# Patient Record
Sex: FEMALE | Race: WHITE | NOT HISPANIC OR LATINO | Employment: OTHER | URBAN - METROPOLITAN AREA
[De-identification: names, ages, dates, MRNs, and addresses within clinical notes are randomized per-mention and may not be internally consistent; named-entity substitution may affect disease eponyms.]

---

## 2017-11-28 ENCOUNTER — HOSPITAL ENCOUNTER (EMERGENCY)
Facility: HOSPITAL | Age: 67
Discharge: HOME | End: 2017-11-28
Payer: MEDICARE

## 2017-11-28 VITALS
OXYGEN SATURATION: 100 % | BODY MASS INDEX: 22.06 KG/M2 | HEART RATE: 100 BPM | WEIGHT: 112.38 LBS | RESPIRATION RATE: 18 BRPM | SYSTOLIC BLOOD PRESSURE: 150 MMHG | HEIGHT: 60 IN | DIASTOLIC BLOOD PRESSURE: 67 MMHG | TEMPERATURE: 99 F

## 2017-11-28 DIAGNOSIS — R05.9 COUGH: ICD-10-CM

## 2017-11-28 DIAGNOSIS — R09.82 POST-NASAL DRIP: ICD-10-CM

## 2017-11-28 DIAGNOSIS — J40 BRONCHITIS: Primary | ICD-10-CM

## 2017-11-28 PROCEDURE — 99283 EMERGENCY DEPT VISIT LOW MDM: CPT

## 2017-11-28 RX ORDER — FLUTICASONE PROPIONATE 50 MCG
2 SPRAY, SUSPENSION (ML) NASAL DAILY
Qty: 1 BOTTLE | Refills: 0 | Status: ON HOLD | OUTPATIENT
Start: 2017-11-28 | End: 2023-06-11

## 2017-11-28 RX ORDER — BENZONATATE 100 MG/1
100 CAPSULE ORAL 3 TIMES DAILY PRN
Qty: 30 CAPSULE | Refills: 0 | Status: SHIPPED | OUTPATIENT
Start: 2017-11-28 | End: 2017-12-08

## 2017-11-28 RX ORDER — LORATADINE 10 MG/1
10 TABLET ORAL DAILY
COMMUNITY

## 2017-11-29 NOTE — ED NOTES
Patient sitting talking with family. Chest xray completed. Patient talking in full sentences. Nad noted

## 2017-11-29 NOTE — DISCHARGE INSTRUCTIONS
Rest  Drink plenty of clear fluids--at least 64 ounces of water/juice  Normal saline nasal wash to irrigate sinuses and for congestion/runny nose  Cool mist humidifier/vaporizer  Practice good handwashing  Zyrtec or Claritin to help dry mucus and post nasal drip  Mucinex, Robitussin, Coricidin HBP Cough and Cold for cough and chest congestion  Tylenol or Ibuprofen for fever, headache and body aches  Warm salt water gargles for throat comfort  Chloraseptic spray or lozenges for throat comfort  See PCP or go to ER if symptoms worsen or fail to improve with treatment.

## 2017-11-29 NOTE — ED PROVIDER NOTES
History      Chief Complaint   Patient presents with    URI     Patient reports that she has been havintg a cold for over 1 week. she reports its the coughing that is the worse       Review of patient's allergies indicates:   Allergen Reactions    Iodine and iodide containing products Rash        HPI   HPI    2017, 6:41 PM   History obtained from the patient      History of Present Illness: Pia Mariee is a 67 y.o. female patient who presents to the Emergency Department for cold x one week.  Associated symptoms include cough, sneezing, sore throat, post nasal drip, nasal congestion, laryngitis.  Patient reports negative strep culture.  Denies fever, otalgia, chest pain, wheezing, SOB. Treatments tried include Claritin.        Arrival mode: Personal vehicle      PCP: Primary Doctor No       Past Medical History:  Past Medical History:   Diagnosis Date    Anemia     Arthritis     Cancer breast    right    Carotid artery disease     left    Coronary artery disease     Fibromyalgia     GERD (gastroesophageal reflux disease)     High cholesterol     Hypertension     Osteoporosis        Past Surgical History:  Past Surgical History:   Procedure Laterality Date    BLADDER SURGERY      BREAST SURGERY      CARDIAC SURGERY       SECTION      MASTECTOMY, RADICAL Right     TUBAL LIGATION           Family History:  Family History   Problem Relation Age of Onset    Family history unknown: Yes       Social History:  Social History     Social History Main Topics    Smoking status: Never Smoker    Smokeless tobacco: Never Used    Alcohol use No    Drug use: No    Sexual activity: Not on file       ROS   Review of Systems   Constitutional: Negative for chills and fever.   HENT: Positive for congestion, postnasal drip, sneezing and sore throat. Negative for ear pain.    Respiratory: Positive for cough. Negative for shortness of breath and wheezing.    Cardiovascular: Negative for  chest pain and palpitations.   Gastrointestinal: Negative for diarrhea and vomiting.       Physical Exam      Initial Vitals [11/28/17 1734]   BP Pulse Resp Temp SpO2   (!) 140/67 105 20 98.5 °F (36.9 °C) 99 %      MAP       91.33          Physical Exam  Nursing Notes and Vital Signs Reviewed.  Constitutional: Patient is in no apparent distress. Awake and alert. Well-developed and well-nourished.  Head: Atraumatic. Normocephalic.  Eyes: PERRL. EOM intact. Conjunctivae are not pale. No scleral icterus.  ENT: Mucous membranes are moist. Oropharynx is clear and symmetric.  Nasal congestion. Post nasal drip  Neck: Supple. Full ROM. No lymphadenopathy.  Cardiovascular: Regular rate. Regular rhythm. No murmurs, rubs, or gallops. Distal pulses are 2+ and symmetric.  Pulmonary/Chest: No respiratory distress. Clear to auscultation bilaterally. No wheezing, rales, or rhonchi.  Cough noted.  Abdominal: Soft and non-distended.  There is no tenderness.  No rebound, guarding, or rigidity. Good bowel sounds.  Genitourinary: No CVA tenderness  Musculoskeletal: Moves all extremities. No obvious deformities. No edema. No calf tenderness.  Skin: Warm and dry.  Neurological:  Alert, awake, and appropriate.  Normal speech.  No acute focal neurological deficits are appreciated.  Psychiatric: Normal affect. Good eye contact. Appropriate in content.    ED Course    Procedures  ED Vital Signs:  Vitals:    11/28/17 1734 11/28/17 1950   BP: (!) 140/67 (!) 150/67   Pulse: 105 100   Resp: 20 18   Temp: 98.5 °F (36.9 °C) 99.1 °F (37.3 °C)   TempSrc: Oral Oral   SpO2: 99% 100%   Weight: 51 kg (112 lb 6.4 oz)    Height: 5' (1.524 m)        Abnormal Lab Results:  Labs Reviewed - No data to display         Imaging Results:  Imaging Results          X-Ray Chest PA And Lateral (Final result)  Result time 11/28/17 19:46:45    Final result by Chaz Mendez MD (11/28/17 19:46:45)                 Impression:     No acute process. No significant change  from prior exam.      Electronically signed by: RHONDA GUNTER MD  Date:     11/28/17  Time:    19:46              Narrative:    Exam: Chest X-ray, two views.    History: Cough    Findings: No infiltrate or effusion identified. Cardiomediastinal silhouette is within normal limits. No significant change from prior study dated 02/01/2016. Right mastectomy again noted.                                      The Emergency Provider reviewed the vital signs and test results, which are outlined above.    ED Discussion     8:03 PM:  Discussed with pt all pertinent ED information and results. Discussed pt dx and plan of tx. Gave pt all f/u and return to the ED instructions. All questions and concerns were addressed at this time. Pt expresses understanding of information and instructions, and is comfortable with plan to discharge. Pt is stable for discharge.    Pre-hypertension/Hypertension: The pt has been informed that they may have pre-hypertension or hypertension based on a blood pressure reading in the ED. I recommend that the pt call the PCP listed on their discharge instructions or a physician of their choice this week to arrange f/u for further evaluation of possible pre-hypertension or hypertension.       ED Medication(s):  Medications - No data to display    Discharge Medication List as of 11/28/2017  8:04 PM      START taking these medications    Details   benzonatate (TESSALON) 100 MG capsule Take 1 capsule (100 mg total) by mouth 3 (three) times daily as needed for Cough., Starting Tue 11/28/2017, Until Fri 12/8/2017, Print      fluticasone (FLONASE) 50 mcg/actuation nasal spray 2 sprays by Each Nare route once daily., Starting Tue 11/28/2017, Print             Follow-up Information     Primary Care Doctor.    Contact information:  Follow-up in 2-3 days                   Medical Decision Making                  Clinical Impression       ICD-10-CM ICD-9-CM   1. Bronchitis J40 490   2. Cough R05 786.2   3. Post-nasal  fiordaliza R09.82 784.91       Disposition:   Disposition: Discharged  Condition: Stable           Neha Trinidad PA-C  11/28/17 [id number]

## 2021-04-09 ENCOUNTER — TELEPHONE (OUTPATIENT)
Dept: RHEUMATOLOGY | Facility: CLINIC | Age: 71
End: 2021-04-09

## 2023-04-22 ENCOUNTER — HOSPITAL ENCOUNTER (EMERGENCY)
Facility: HOSPITAL | Age: 73
Discharge: HOME | End: 2023-04-22
Attending: EMERGENCY MEDICINE
Payer: MEDICARE

## 2023-04-22 VITALS
RESPIRATION RATE: 18 BRPM | HEART RATE: 81 BPM | WEIGHT: 116.19 LBS | HEIGHT: 60 IN | BODY MASS INDEX: 22.81 KG/M2 | TEMPERATURE: 98 F | OXYGEN SATURATION: 95 % | SYSTOLIC BLOOD PRESSURE: 172 MMHG | DIASTOLIC BLOOD PRESSURE: 72 MMHG

## 2023-04-22 DIAGNOSIS — J06.9 VIRAL URI WITH COUGH: Primary | ICD-10-CM

## 2023-04-22 DIAGNOSIS — R05.9 COUGH: ICD-10-CM

## 2023-04-22 LAB
CTP QC/QA: YES
SARS-COV-2 RDRP RESP QL NAA+PROBE: NEGATIVE

## 2023-04-22 PROCEDURE — 99283 EMERGENCY DEPT VISIT LOW MDM: CPT | Mod: 25,ER

## 2023-04-22 RX ORDER — PROMETHAZINE HYDROCHLORIDE AND DEXTROMETHORPHAN HYDROBROMIDE 6.25; 15 MG/5ML; MG/5ML
5 SYRUP ORAL EVERY 6 HOURS PRN
Qty: 120 ML | Refills: 0 | Status: SHIPPED | OUTPATIENT
Start: 2023-04-22 | End: 2023-05-02

## 2023-04-24 NOTE — ED PROVIDER NOTES
Encounter Date: 2023       History     Chief Complaint   Patient presents with    Cough     Cough, sneezing runny nose x 5 days     Patient complains of a cough nasal congestion for 5 days. Denies mitigating or exacerbating factors did not take anything prior to arrival for this problem        Review of patient's allergies indicates:   Allergen Reactions    Iodine and iodide containing products Rash     Past Medical History:   Diagnosis Date    Anemia     Arthritis     Cancer breast    right    Carotid artery disease     left    Coronary artery disease     Fibromyalgia     GERD (gastroesophageal reflux disease)     High cholesterol     Hypertension     Osteoporosis      Past Surgical History:   Procedure Laterality Date    BLADDER SURGERY      BREAST SURGERY      CARDIAC SURGERY       SECTION      MASTECTOMY, RADICAL Right     TUBAL LIGATION       Family History   Family history unknown: Yes     Social History     Tobacco Use    Smoking status: Never    Smokeless tobacco: Never   Substance Use Topics    Alcohol use: No     Alcohol/week: 0.0 standard drinks    Drug use: No     Review of Systems   Constitutional:  Negative for fever.   HENT:  Negative for sore throat.    Respiratory:  Negative for shortness of breath.    Cardiovascular:  Negative for chest pain.   Gastrointestinal:  Negative for nausea.   Genitourinary:  Negative for dysuria.   Musculoskeletal:  Negative for back pain.   Skin:  Negative for rash.   Neurological:  Negative for weakness.   Hematological:  Does not bruise/bleed easily.     Physical Exam     Initial Vitals [23 1134]   BP Pulse Resp Temp SpO2   (!) 172/72 81 18 98 °F (36.7 °C) 95 %      MAP       --         Physical Exam    Nursing note and vitals reviewed.  Constitutional: She appears well-developed and well-nourished. She is not diaphoretic. She is active.  Non-toxic appearance. No distress.   HENT:   Head: Normocephalic and atraumatic.   Eyes: Conjunctivae are normal.  Right eye exhibits no discharge. Left eye exhibits no discharge. No scleral icterus.   Neck:   Normal range of motion.  Cardiovascular:  Normal rate, regular rhythm and intact distal pulses.           No murmur heard.  Pulmonary/Chest: Breath sounds normal. No respiratory distress. She has no wheezes. She has no rhonchi. She has no rales. She exhibits no tenderness.   Abdominal: She exhibits no distension.   Musculoskeletal:         General: No tenderness. Normal range of motion.      Cervical back: Normal range of motion.     Neurological: She is alert and oriented to person, place, and time. No cranial nerve deficit. GCS score is 15. GCS eye subscore is 4. GCS verbal subscore is 5. GCS motor subscore is 6.   Skin: Skin is warm and dry. Capillary refill takes less than 2 seconds. No rash noted.   Psychiatric: She has a normal mood and affect. Her behavior is normal. Judgment and thought content normal.       ED Course   Procedures  Labs Reviewed   SARS-COV-2 RDRP GENE          Imaging Results              X-Ray Chest 1 View (Final result)  Result time 04/22/23 13:47:12      Final result by Vamsi Whaley MD (Timothy) (04/22/23 13:47:12)                   Impression:      Negative single view chest x-ray.      Electronically signed by: Vamsi Whaley MD  Date:    04/22/2023  Time:    13:47               Narrative:    EXAMINATION:  XR CHEST 1 VIEW    CLINICAL HISTORY:  Cough,    COMPARISON:  11/28/2017    FINDINGS:  Heart size is normal. The lung fields are clear. No acute cardiopulmonary infiltrate.                                       Medications - No data to display                           Clinical Impression:   Final diagnoses:  [R05.9] Cough  [J06.9] Viral URI with cough (Primary)        ED Disposition Condition    Discharge Stable          ED Prescriptions       Medication Sig Dispense Start Date End Date Auth. Provider    promethazine-dextromethorphan (PROMETHAZINE-DM) 6.25-15 mg/5 mL Syrp Take 5 mLs by  mouth every 6 (six) hours as needed. 120 mL 4/22/2023 5/2/2023 Robe Grossman NP          Follow-up Information       Follow up With Specialties Details Why Contact Info    pcp  Schedule an appointment as soon as possible for a visit  As needed              Robe Grossman NP  04/23/23 1933

## 2023-06-11 ENCOUNTER — HOSPITAL ENCOUNTER (INPATIENT)
Facility: HOSPITAL | Age: 73
LOS: 1 days | Discharge: HOME | DRG: 641 | End: 2023-06-12
Attending: EMERGENCY MEDICINE | Admitting: INTERNAL MEDICINE
Payer: MEDICARE

## 2023-06-11 DIAGNOSIS — R42 LIGHTHEADED: ICD-10-CM

## 2023-06-11 DIAGNOSIS — R42 DIZZINESS: ICD-10-CM

## 2023-06-11 DIAGNOSIS — E87.6 HYPOKALEMIA: ICD-10-CM

## 2023-06-11 DIAGNOSIS — R94.31 ABNORMAL EKG: ICD-10-CM

## 2023-06-11 DIAGNOSIS — R07.9 CHEST PAIN: ICD-10-CM

## 2023-06-11 DIAGNOSIS — E83.42 HYPOMAGNESEMIA: ICD-10-CM

## 2023-06-11 DIAGNOSIS — R00.2 PALPITATIONS: Primary | ICD-10-CM

## 2023-06-11 PROBLEM — E78.2 MIXED HYPERLIPIDEMIA: Status: ACTIVE | Noted: 2023-06-11

## 2023-06-11 PROBLEM — K21.9 GERD (GASTROESOPHAGEAL REFLUX DISEASE): Status: ACTIVE | Noted: 2023-06-11

## 2023-06-11 PROBLEM — D64.89 OTHER SPECIFIED ANEMIAS: Status: ACTIVE | Noted: 2023-06-11

## 2023-06-11 LAB
ALBUMIN SERPL BCP-MCNC: 4.1 G/DL (ref 3.5–5.2)
ALP SERPL-CCNC: 64 U/L (ref 55–135)
ALT SERPL W/O P-5'-P-CCNC: 20 U/L (ref 10–44)
ANION GAP SERPL CALC-SCNC: 16 MMOL/L (ref 8–16)
AST SERPL-CCNC: 20 U/L (ref 10–40)
BACTERIA #/AREA URNS AUTO: ABNORMAL /HPF
BASOPHILS # BLD AUTO: 0.04 K/UL (ref 0–0.2)
BASOPHILS NFR BLD: 0.4 % (ref 0–1.9)
BILIRUB SERPL-MCNC: 0.4 MG/DL (ref 0.1–1)
BILIRUB UR QL STRIP: NEGATIVE
BNP SERPL-MCNC: 70 PG/ML (ref 0–99)
BUN SERPL-MCNC: 11 MG/DL (ref 8–23)
CALCIUM SERPL-MCNC: 9.4 MG/DL (ref 8.7–10.5)
CHLORIDE SERPL-SCNC: 103 MMOL/L (ref 95–110)
CHLORIDE UR-SCNC: 95 MMOL/L (ref 25–200)
CLARITY UR REFRACT.AUTO: CLEAR
CO2 SERPL-SCNC: 22 MMOL/L (ref 23–29)
COLOR UR AUTO: YELLOW
CREAT SERPL-MCNC: 1.3 MG/DL (ref 0.5–1.4)
CREAT UR-MCNC: 78.5 MG/DL (ref 15–325)
D DIMER PPP IA.FEU-MCNC: 0.27 MG/L FEU
DIFFERENTIAL METHOD: ABNORMAL
EOSINOPHIL # BLD AUTO: 0 K/UL (ref 0–0.5)
EOSINOPHIL NFR BLD: 0.2 % (ref 0–8)
ERYTHROCYTE [DISTWIDTH] IN BLOOD BY AUTOMATED COUNT: 13.4 % (ref 11.5–14.5)
EST. GFR  (NO RACE VARIABLE): 42.9 ML/MIN/1.73 M^2
GLUCOSE SERPL-MCNC: 135 MG/DL (ref 70–110)
GLUCOSE UR QL STRIP: NEGATIVE
HCT VFR BLD AUTO: 35.1 % (ref 37–48.5)
HGB BLD-MCNC: 11.4 G/DL (ref 12–16)
HGB UR QL STRIP: NEGATIVE
HYALINE CASTS UR QL AUTO: 0 /LPF
IMM GRANULOCYTES # BLD AUTO: 0.04 K/UL (ref 0–0.04)
IMM GRANULOCYTES NFR BLD AUTO: 0.4 % (ref 0–0.5)
KETONES UR QL STRIP: NEGATIVE
LEUKOCYTE ESTERASE UR QL STRIP: ABNORMAL
LYMPHOCYTES # BLD AUTO: 1.1 K/UL (ref 1–4.8)
LYMPHOCYTES NFR BLD: 10.1 % (ref 18–48)
MAGNESIUM SERPL-MCNC: 1.5 MG/DL (ref 1.6–2.6)
MCH RBC QN AUTO: 33.6 PG (ref 27–31)
MCHC RBC AUTO-ENTMCNC: 32.5 G/DL (ref 32–36)
MCV RBC AUTO: 104 FL (ref 82–98)
MICROSCOPIC COMMENT: ABNORMAL
MONOCYTES # BLD AUTO: 0.7 K/UL (ref 0.3–1)
MONOCYTES NFR BLD: 6.5 % (ref 4–15)
NEUTROPHILS # BLD AUTO: 9.2 K/UL (ref 1.8–7.7)
NEUTROPHILS NFR BLD: 82.4 % (ref 38–73)
NITRITE UR QL STRIP: NEGATIVE
NRBC BLD-RTO: 0 /100 WBC
PH UR STRIP: 6 [PH] (ref 5–8)
PHOSPHATE SERPL-MCNC: 2.9 MG/DL (ref 2.7–4.5)
PLATELET # BLD AUTO: 466 K/UL (ref 150–450)
PMV BLD AUTO: 10.3 FL (ref 9.2–12.9)
POTASSIUM SERPL-SCNC: 2.9 MMOL/L (ref 3.5–5.1)
POTASSIUM UR-SCNC: 33 MMOL/L (ref 15–95)
PROT SERPL-MCNC: 8 G/DL (ref 6–8.4)
PROT UR QL STRIP: ABNORMAL
RBC # BLD AUTO: 3.39 M/UL (ref 4–5.4)
RBC #/AREA URNS AUTO: 2 /HPF (ref 0–4)
SODIUM SERPL-SCNC: 141 MMOL/L (ref 136–145)
SODIUM UR-SCNC: 61 MMOL/L (ref 20–250)
SP GR UR STRIP: 1.01 (ref 1–1.03)
TROPONIN I SERPL DL<=0.01 NG/ML-MCNC: 0.02 NG/ML (ref 0–0.03)
URN SPEC COLLECT METH UR: ABNORMAL
UROBILINOGEN UR STRIP-ACNC: NEGATIVE EU/DL
WBC # BLD AUTO: 11.16 K/UL (ref 3.9–12.7)
WBC #/AREA URNS AUTO: 7 /HPF (ref 0–5)

## 2023-06-11 PROCEDURE — 84300 ASSAY OF URINE SODIUM: CPT | Mod: ER | Performed by: EMERGENCY MEDICINE

## 2023-06-11 PROCEDURE — 84484 ASSAY OF TROPONIN QUANT: CPT | Mod: ER | Performed by: EMERGENCY MEDICINE

## 2023-06-11 PROCEDURE — 84100 ASSAY OF PHOSPHORUS: CPT | Mod: ER | Performed by: EMERGENCY MEDICINE

## 2023-06-11 PROCEDURE — 93010 EKG 12-LEAD: ICD-10-PCS | Mod: ,,, | Performed by: INTERNAL MEDICINE

## 2023-06-11 PROCEDURE — 96368 THER/DIAG CONCURRENT INF: CPT | Mod: ER

## 2023-06-11 PROCEDURE — 25000003 PHARM REV CODE 250: Mod: ER | Performed by: EMERGENCY MEDICINE

## 2023-06-11 PROCEDURE — 93010 ELECTROCARDIOGRAM REPORT: CPT | Mod: ,,, | Performed by: INTERNAL MEDICINE

## 2023-06-11 PROCEDURE — 82570 ASSAY OF URINE CREATININE: CPT | Mod: ER | Performed by: EMERGENCY MEDICINE

## 2023-06-11 PROCEDURE — 21400001 HC TELEMETRY ROOM

## 2023-06-11 PROCEDURE — 85025 COMPLETE CBC W/AUTO DIFF WBC: CPT | Mod: ER | Performed by: EMERGENCY MEDICINE

## 2023-06-11 PROCEDURE — 94761 N-INVAS EAR/PLS OXIMETRY MLT: CPT

## 2023-06-11 PROCEDURE — 96365 THER/PROPH/DIAG IV INF INIT: CPT | Mod: ER

## 2023-06-11 PROCEDURE — 25000003 PHARM REV CODE 250: Performed by: NURSE PRACTITIONER

## 2023-06-11 PROCEDURE — 83880 ASSAY OF NATRIURETIC PEPTIDE: CPT | Mod: ER | Performed by: EMERGENCY MEDICINE

## 2023-06-11 PROCEDURE — 63600175 PHARM REV CODE 636 W HCPCS: Mod: ER | Performed by: EMERGENCY MEDICINE

## 2023-06-11 PROCEDURE — 83735 ASSAY OF MAGNESIUM: CPT | Mod: ER | Performed by: EMERGENCY MEDICINE

## 2023-06-11 PROCEDURE — 85379 FIBRIN DEGRADATION QUANT: CPT | Mod: ER | Performed by: EMERGENCY MEDICINE

## 2023-06-11 PROCEDURE — 84133 ASSAY OF URINE POTASSIUM: CPT | Mod: ER | Performed by: EMERGENCY MEDICINE

## 2023-06-11 PROCEDURE — 80053 COMPREHEN METABOLIC PANEL: CPT | Mod: ER | Performed by: EMERGENCY MEDICINE

## 2023-06-11 PROCEDURE — 82436 ASSAY OF URINE CHLORIDE: CPT | Mod: ER | Performed by: EMERGENCY MEDICINE

## 2023-06-11 PROCEDURE — 93005 ELECTROCARDIOGRAM TRACING: CPT | Mod: ER

## 2023-06-11 PROCEDURE — 99285 EMERGENCY DEPT VISIT HI MDM: CPT | Mod: 25,ER

## 2023-06-11 PROCEDURE — 96366 THER/PROPH/DIAG IV INF ADDON: CPT | Mod: ER

## 2023-06-11 PROCEDURE — 81000 URINALYSIS NONAUTO W/SCOPE: CPT | Mod: ER | Performed by: EMERGENCY MEDICINE

## 2023-06-11 RX ORDER — PROMETHAZINE HYDROCHLORIDE 25 MG/1
25 TABLET ORAL EVERY 6 HOURS PRN
Status: DISCONTINUED | OUTPATIENT
Start: 2023-06-11 | End: 2023-06-12 | Stop reason: HOSPADM

## 2023-06-11 RX ORDER — POTASSIUM CHLORIDE 20 MEQ/1
40 TABLET, EXTENDED RELEASE ORAL
Status: COMPLETED | OUTPATIENT
Start: 2023-06-11 | End: 2023-06-11

## 2023-06-11 RX ORDER — LOSARTAN POTASSIUM 50 MG/1
TABLET ORAL
COMMUNITY
Start: 2023-03-24

## 2023-06-11 RX ORDER — METOPROLOL SUCCINATE 50 MG/1
100 TABLET, EXTENDED RELEASE ORAL DAILY
Status: DISCONTINUED | OUTPATIENT
Start: 2023-06-12 | End: 2023-06-12 | Stop reason: HOSPADM

## 2023-06-11 RX ORDER — NALOXONE HCL 0.4 MG/ML
0.02 VIAL (ML) INJECTION
Status: DISCONTINUED | OUTPATIENT
Start: 2023-06-11 | End: 2023-06-12 | Stop reason: HOSPADM

## 2023-06-11 RX ORDER — LOSARTAN POTASSIUM 50 MG/1
50 TABLET ORAL 2 TIMES DAILY
Status: DISCONTINUED | OUTPATIENT
Start: 2023-06-11 | End: 2023-06-12 | Stop reason: HOSPADM

## 2023-06-11 RX ORDER — ASPIRIN 81 MG/1
81 TABLET ORAL DAILY
COMMUNITY

## 2023-06-11 RX ORDER — ASPIRIN 81 MG/1
81 TABLET ORAL DAILY
Status: DISCONTINUED | OUTPATIENT
Start: 2023-06-12 | End: 2023-06-12 | Stop reason: HOSPADM

## 2023-06-11 RX ORDER — FERROUS GLUCONATE 324(38)MG
324 TABLET ORAL
COMMUNITY

## 2023-06-11 RX ORDER — CETIRIZINE HYDROCHLORIDE 10 MG/1
10 TABLET ORAL DAILY
Status: DISCONTINUED | OUTPATIENT
Start: 2023-06-12 | End: 2023-06-11 | Stop reason: DRUGHIGH

## 2023-06-11 RX ORDER — IBUPROFEN 200 MG
16 TABLET ORAL
Status: DISCONTINUED | OUTPATIENT
Start: 2023-06-11 | End: 2023-06-12 | Stop reason: HOSPADM

## 2023-06-11 RX ORDER — MAGNESIUM SULFATE 1 G/100ML
1 INJECTION INTRAVENOUS
Status: COMPLETED | OUTPATIENT
Start: 2023-06-11 | End: 2023-06-11

## 2023-06-11 RX ORDER — METOPROLOL SUCCINATE 100 MG/1
100 TABLET, EXTENDED RELEASE ORAL
COMMUNITY
Start: 2023-05-11 | End: 2024-03-03

## 2023-06-11 RX ORDER — FERROUS GLUCONATE 324(37.5)
324 TABLET ORAL
Status: DISCONTINUED | OUTPATIENT
Start: 2023-06-12 | End: 2023-06-12 | Stop reason: HOSPADM

## 2023-06-11 RX ORDER — IBUPROFEN 200 MG
24 TABLET ORAL
Status: DISCONTINUED | OUTPATIENT
Start: 2023-06-11 | End: 2023-06-12 | Stop reason: HOSPADM

## 2023-06-11 RX ORDER — HYDRALAZINE HYDROCHLORIDE 25 MG/1
25 TABLET, FILM COATED ORAL EVERY 12 HOURS
Status: DISCONTINUED | OUTPATIENT
Start: 2023-06-11 | End: 2023-06-12 | Stop reason: HOSPADM

## 2023-06-11 RX ORDER — TALC
6 POWDER (GRAM) TOPICAL NIGHTLY PRN
Status: DISCONTINUED | OUTPATIENT
Start: 2023-06-11 | End: 2023-06-12 | Stop reason: HOSPADM

## 2023-06-11 RX ORDER — ACETAMINOPHEN 650 MG/1
650 SUPPOSITORY RECTAL EVERY 4 HOURS PRN
Status: DISCONTINUED | OUTPATIENT
Start: 2023-06-11 | End: 2023-06-12 | Stop reason: HOSPADM

## 2023-06-11 RX ORDER — MAG HYDROX/ALUMINUM HYD/SIMETH 200-200-20
30 SUSPENSION, ORAL (FINAL DOSE FORM) ORAL 4 TIMES DAILY PRN
Status: DISCONTINUED | OUTPATIENT
Start: 2023-06-11 | End: 2023-06-12 | Stop reason: HOSPADM

## 2023-06-11 RX ORDER — GLUCAGON 1 MG
1 KIT INJECTION
Status: DISCONTINUED | OUTPATIENT
Start: 2023-06-11 | End: 2023-06-12 | Stop reason: HOSPADM

## 2023-06-11 RX ORDER — ACETAMINOPHEN 325 MG/1
650 TABLET ORAL EVERY 8 HOURS PRN
Status: DISCONTINUED | OUTPATIENT
Start: 2023-06-11 | End: 2023-06-12

## 2023-06-11 RX ORDER — ATORVASTATIN CALCIUM 40 MG/1
40 TABLET, FILM COATED ORAL NIGHTLY
Status: DISCONTINUED | OUTPATIENT
Start: 2023-06-11 | End: 2023-06-12 | Stop reason: HOSPADM

## 2023-06-11 RX ORDER — ACETAMINOPHEN 500 MG
5000 TABLET ORAL WEEKLY
COMMUNITY

## 2023-06-11 RX ORDER — ENOXAPARIN SODIUM 100 MG/ML
30 INJECTION SUBCUTANEOUS EVERY 24 HOURS
Status: DISCONTINUED | OUTPATIENT
Start: 2023-06-12 | End: 2023-06-12

## 2023-06-11 RX ORDER — ENOXAPARIN SODIUM 100 MG/ML
40 INJECTION SUBCUTANEOUS EVERY 24 HOURS
Status: DISCONTINUED | OUTPATIENT
Start: 2023-06-12 | End: 2023-06-11 | Stop reason: DRUGHIGH

## 2023-06-11 RX ORDER — POLYETHYLENE GLYCOL 3350 17 G/17G
17 POWDER, FOR SOLUTION ORAL DAILY PRN
Status: DISCONTINUED | OUTPATIENT
Start: 2023-06-11 | End: 2023-06-12 | Stop reason: HOSPADM

## 2023-06-11 RX ORDER — SIMETHICONE 80 MG
1 TABLET,CHEWABLE ORAL 4 TIMES DAILY PRN
Status: DISCONTINUED | OUTPATIENT
Start: 2023-06-11 | End: 2023-06-12 | Stop reason: HOSPADM

## 2023-06-11 RX ORDER — POTASSIUM CHLORIDE 7.45 MG/ML
10 INJECTION INTRAVENOUS
Status: DISPENSED | OUTPATIENT
Start: 2023-06-11 | End: 2023-06-11

## 2023-06-11 RX ORDER — ONDANSETRON 2 MG/ML
4 INJECTION INTRAMUSCULAR; INTRAVENOUS EVERY 8 HOURS PRN
Status: DISCONTINUED | OUTPATIENT
Start: 2023-06-11 | End: 2023-06-12 | Stop reason: HOSPADM

## 2023-06-11 RX ORDER — PANTOPRAZOLE SODIUM 40 MG/1
40 TABLET, DELAYED RELEASE ORAL DAILY
COMMUNITY

## 2023-06-11 RX ORDER — SODIUM CHLORIDE 0.9 % (FLUSH) 0.9 %
3 SYRINGE (ML) INJECTION EVERY 12 HOURS PRN
Status: DISCONTINUED | OUTPATIENT
Start: 2023-06-11 | End: 2023-06-12 | Stop reason: HOSPADM

## 2023-06-11 RX ORDER — FOLIC ACID 1 MG/1
1 TABLET ORAL DAILY
Status: DISCONTINUED | OUTPATIENT
Start: 2023-06-12 | End: 2023-06-12 | Stop reason: HOSPADM

## 2023-06-11 RX ORDER — HYDRALAZINE HYDROCHLORIDE 25 MG/1
25 TABLET, FILM COATED ORAL EVERY 12 HOURS
COMMUNITY
Start: 2023-04-14

## 2023-06-11 RX ORDER — HYDROCHLOROTHIAZIDE 12.5 MG/1
12.5 TABLET ORAL
COMMUNITY

## 2023-06-11 RX ORDER — PANTOPRAZOLE SODIUM 40 MG/1
40 TABLET, DELAYED RELEASE ORAL DAILY
Status: DISCONTINUED | OUTPATIENT
Start: 2023-06-12 | End: 2023-06-12 | Stop reason: HOSPADM

## 2023-06-11 RX ORDER — CETIRIZINE HYDROCHLORIDE 5 MG/1
5 TABLET ORAL DAILY
Status: DISCONTINUED | OUTPATIENT
Start: 2023-06-12 | End: 2023-06-12 | Stop reason: HOSPADM

## 2023-06-11 RX ORDER — SODIUM CHLORIDE 9 MG/ML
INJECTION, SOLUTION INTRAVENOUS CONTINUOUS
Status: DISCONTINUED | OUTPATIENT
Start: 2023-06-11 | End: 2023-06-12

## 2023-06-11 RX ADMIN — ATORVASTATIN CALCIUM 40 MG: 40 TABLET, FILM COATED ORAL at 09:06

## 2023-06-11 RX ADMIN — POTASSIUM CHLORIDE 40 MEQ: 1500 TABLET, EXTENDED RELEASE ORAL at 03:06

## 2023-06-11 RX ADMIN — SODIUM CHLORIDE: 9 INJECTION, SOLUTION INTRAVENOUS at 09:06

## 2023-06-11 RX ADMIN — ACETAMINOPHEN 650 MG: 325 TABLET ORAL at 09:06

## 2023-06-11 RX ADMIN — HYDRALAZINE HYDROCHLORIDE 25 MG: 25 TABLET, FILM COATED ORAL at 09:06

## 2023-06-11 RX ADMIN — MAGNESIUM SULFATE IN DEXTROSE 1 G: 10 INJECTION, SOLUTION INTRAVENOUS at 03:06

## 2023-06-11 RX ADMIN — POTASSIUM CHLORIDE 10 MEQ: 7.46 INJECTION, SOLUTION INTRAVENOUS at 05:06

## 2023-06-11 RX ADMIN — POTASSIUM CHLORIDE 10 MEQ: 7.46 INJECTION, SOLUTION INTRAVENOUS at 03:06

## 2023-06-11 RX ADMIN — LOSARTAN POTASSIUM 50 MG: 50 TABLET, FILM COATED ORAL at 09:06

## 2023-06-11 RX ADMIN — POTASSIUM CHLORIDE 10 MEQ: 7.46 INJECTION, SOLUTION INTRAVENOUS at 06:06

## 2023-06-11 NOTE — ED PROVIDER NOTES
Encounter Date: 2023       History     Chief Complaint   Patient presents with    Palpitations     With hypertension since this morning.      73-year-old female with past medical history of breast cancer, coronary artery disease, osteoporosis, hypertension, high cholesterol presents to the emergency department with complaints of palpitations.  This started this morning.  Patient reports she awoke lightheaded and has felt palpitations throughout the morning.  Patient checked her blood pressure several times, but review of these numbers show normal blood pressures.  Patient additionally is been more short of breath than usual for several months.  Patient denies any chest pain, injury, abdominal pain, cough, congestion.    The history is provided by the patient.   Review of patient's allergies indicates:   Allergen Reactions    Iodine and iodide containing products Rash     Past Medical History:   Diagnosis Date    Anemia     Arthritis     Cancer breast    right    Carotid artery disease     left    Coronary artery disease     Fibromyalgia     GERD (gastroesophageal reflux disease)     High cholesterol     Hypertension     Osteoporosis      Past Surgical History:   Procedure Laterality Date    BLADDER SURGERY      BREAST SURGERY      CARDIAC SURGERY       SECTION      MASTECTOMY, RADICAL Right     TUBAL LIGATION       Family History   Family history unknown: Yes     Social History     Tobacco Use    Smoking status: Never    Smokeless tobacco: Never   Substance Use Topics    Alcohol use: No     Alcohol/week: 0.0 standard drinks    Drug use: No     Review of Systems   Constitutional:  Negative for diaphoresis and fever.   HENT:  Negative for congestion, dental problem and sore throat.    Eyes:  Negative for pain and visual disturbance.   Respiratory:  Positive for shortness of breath. Negative for cough.    Cardiovascular:  Positive for palpitations. Negative for chest pain.   Gastrointestinal:  Negative for  abdominal pain, diarrhea, nausea and vomiting.   Genitourinary:  Negative for dysuria and flank pain.   Musculoskeletal:  Negative for back pain and neck pain.   Skin:  Negative for rash and wound.   Neurological:  Positive for light-headedness. Negative for weakness, numbness and headaches.   Psychiatric/Behavioral:  Negative for agitation and confusion.      Physical Exam     Initial Vitals [06/11/23 1352]   BP Pulse Resp Temp SpO2   136/70 107 18 97.7 °F (36.5 °C) 98 %      MAP       --         Physical Exam    Constitutional: She appears well-developed and well-nourished.   HENT:   Head: Normocephalic and atraumatic.   Mouth/Throat: Oropharynx is clear and moist.   Eyes: EOM are normal. Pupils are equal, round, and reactive to light.   Neck: Neck supple.   Normal range of motion.  Cardiovascular:            Tachycardia. Irregular rhythm   Pulmonary/Chest: Breath sounds normal. No respiratory distress.   Abdominal: She exhibits no distension. There is no abdominal tenderness.   Musculoskeletal:         General: No tenderness or edema. Normal range of motion.      Cervical back: Normal range of motion and neck supple.     Neurological: She is alert and oriented to person, place, and time. She has normal strength. No sensory deficit.   Skin: Skin is warm and dry.   Psychiatric: She has a normal mood and affect.       ED Course   Procedures  Labs Reviewed   CBC W/ AUTO DIFFERENTIAL - Abnormal; Notable for the following components:       Result Value    RBC 3.39 (*)     Hemoglobin 11.4 (*)     Hematocrit 35.1 (*)      (*)     MCH 33.6 (*)     Platelets 466 (*)     Gran # (ANC) 9.2 (*)     Gran % 82.4 (*)     Lymph % 10.1 (*)     All other components within normal limits   COMPREHENSIVE METABOLIC PANEL - Abnormal; Notable for the following components:    Potassium 2.9 (*)     CO2 22 (*)     Glucose 135 (*)     eGFR 42.9 (*)     All other components within normal limits   URINALYSIS, REFLEX TO URINE CULTURE -  Abnormal; Notable for the following components:    Protein, UA 2+ (*)     Leukocytes, UA Trace (*)     All other components within normal limits    Narrative:     Specimen Source->Urine   MAGNESIUM - Abnormal; Notable for the following components:    Magnesium 1.5 (*)     All other components within normal limits   URINALYSIS MICROSCOPIC - Abnormal; Notable for the following components:    WBC, UA 7 (*)     All other components within normal limits    Narrative:     Specimen Source->Urine   B-TYPE NATRIURETIC PEPTIDE   D DIMER, QUANTITATIVE   TROPONIN I   PHOSPHORUS   SODIUM, URINE, RANDOM   POTASSIUM, URINE, RANDOM   CREATININE, URINE, RANDOM   CHLORIDE, URINE, RANDOM   PROTEIN / CREATININE RATIO, URINE   CHLORIDE, URINE, RANDOM   CREATININE, URINE, RANDOM   POTASSIUM, URINE, RANDOM   SODIUM, URINE, RANDOM     EKG Readings: (Independently Interpreted)   Rate of 98 beats per minute.  Sinus rhythm with marked sinus arrhythmia. CT, QRS, and QTc wnl. No STEMI. Nonspecific ST changes.    ECG Results              EKG 12-lead (In process)  Result time 06/11/23 14:44:36      In process by Interface, Lab In [facility] (06/11/23 14:44:36)                   Narrative:    Test Reason : R42,    Vent. Rate : 098 BPM     Atrial Rate : 098 BPM     P-R Int : 166 ms          QRS Dur : 080 ms      QT Int : 388 ms       P-R-T Axes : 060 044 070 degrees     QTc Int : 495 ms    Sinus rhythm with marked sinus arrythmia  Possible Left atrial enlargement  ST and T wave abnormality, consider inferior ischemia  Prolonged QT  Abnormal ECG  No previous ECGs available    Referred By: [email or web address]   SELF           Confirmed By:                                   Imaging Results              X-Ray Chest AP Portable (Final result)  Result time 06/11/23 14:25:30      Final result by Vamsi Whaley MD (Timothy) (06/11/23 14:25:30)                   Impression:      Stable chest.  Clear lungs.      Electronically signed by: Vamsi Whaley  MD  Date:    06/11/2023  Time:    14:25               Narrative:    EXAMINATION:  XR CHEST AP PORTABLE    CLINICAL HISTORY:  , Lightheaded;    COMPARISON:  Comparison 04/22/2023.    FINDINGS:  Heart size is normal.  Stable thickening at the right lung apex.  No lung infiltrates.                                       Medications   potassium chloride 10 mEq in 100 mL IVPB (10 mEq Intravenous New Bag 6/11/23 1703)   magnesium sulfate in dextrose IVPB (premix) 1 g (0 g Intravenous Stopped 6/11/23 1634)   potassium chloride SA CR tablet 40 mEq (40 mEq Oral Given 6/11/23 1529)         DDx: Arrythmia, electrolyte abnormality, ACS, medication side effect        ED Course as of 06/11/23 1715   Sun Jun 11, 2023   1607 4:07 PM: Discussed case with Dr. Ga ([facility]). Dr. Ga agrees with current care and management of pt and accepts admission.   Admitting Service: [facility]  Admitting Physician: Dr. Ga  Admit to: Obs-Tele       [BA]      ED Course User Index  [BA] Fransisco Olmos MD                 Clinical Impression:   Final diagnoses:  [R42] Lightheaded  [R00.2] Palpitations (Primary)  [E87.6] Hypokalemia  [E83.42] Hypomagnesemia  [R94.31] Abnormal EKG        ED Disposition Condition    Observation Stable                Fransisco Olmos MD  06/11/23 1717

## 2023-06-11 NOTE — CARE UPDATE
[facility] Transfer of Care Note       Referring facility:    Referring provider:   Accepting facility:   Accepting provider:   Admitting provider:   Reason for transfer:    Transfer diagnosis:   Transfer specialty requested:   Transfer specialty notified: Yes  Transfer level:   Bed type requested: Standard  Isolation status: No active isolations   Admission class or status:       Narrative     PT department note patient feeling lightheaded with palpitations exam was unremarkable and labs reveal potassium of 2.9 magnesium 1.5.  Uncertain patient is still taking diuretic on prior med list or if she is had any nausea vomiting or diarrhea.    Objective     Vitals: Temp: 97.7 °F (36.5 °C) (06/11/23 1352)  Pulse: 107 (06/11/23 1352)  Resp: 18 (06/11/23 1352)  BP: 136/70 (06/11/23 1352)  SpO2: 98 % (06/11/23 1352)  Recent Labs: All pertinent labs within the past 24 hours have been reviewed.  CBC:   Recent Labs   Lab 06/11/23  1410   WBC 11.16   HGB 11.4*   HCT 35.1*   *     CMP:   Recent Labs   Lab 06/11/23  1410      K 2.9*      CO2 22*   *   BUN 11   CREATININE 1.3   CALCIUM 9.4   PROT 8.0   ALBUMIN 4.1   BILITOT 0.4   ALKPHOS 64   AST 20   ALT 20   ANIONGAP 16     Magnesium:   Recent Labs   Lab 06/11/23  1410   MG 1.5*     Troponin:   Recent Labs   Lab 06/11/23  1410   TROPONINI 0.020     Urine Studies:   Recent Labs   Lab 06/11/23  1512   COLORU Yellow   APPEARANCEUA Clear   PHUR 6.0   SPECGRAV 1.015   PROTEINUA 2+*   GLUCUA Negative   KETONESU Negative   BILIRUBINUA Negative   OCCULTUA Negative   NITRITE Negative   UROBILINOGEN Negative   LEUKOCYTESUR Trace*   RBCUA 2   WBCUA 7*   BACTERIA Rare   HYALINECASTS 0     Recent imaging:  I have reviewed all pertinent imaging results/findings within the past 24 hours.          IV access:        Peripheral IV - Single Lumen 06/11/23 1417 20 G Anterior;Left;Proximal Forearm (Active)   Site Assessment Clean;Dry;Intact;No redness;No  swelling;Bleeding 06/11/23 1417     Infusions:   Allergies:   Review of patient's allergies indicates:   Allergen Reactions    Iodine and iodide containing products Rash      NPO: No    Anticoagulation:   Anticoagulants       None             Instructions    Replace electrolytes check urine electrolytes, echocardiogram on arrives at hospital, med rec when arrives  [facility]  Admit to Hospital Medicine  Upon patient arrival to floor, please contact Hospital Medicine on call.

## 2023-06-12 VITALS
HEART RATE: 77 BPM | OXYGEN SATURATION: 94 % | SYSTOLIC BLOOD PRESSURE: 148 MMHG | BODY MASS INDEX: 22.38 KG/M2 | DIASTOLIC BLOOD PRESSURE: 69 MMHG | WEIGHT: 114 LBS | RESPIRATION RATE: 19 BRPM | HEIGHT: 60 IN | TEMPERATURE: 99 F

## 2023-06-12 LAB
ANION GAP SERPL CALC-SCNC: 8 MMOL/L (ref 8–16)
AORTIC ROOT ANNULUS: 2.94 CM
ASCENDING AORTA: 3.1 CM
AV INDEX (PROSTH): 0.97
AV MEAN GRADIENT: 4 MMHG
AV PEAK GRADIENT: 6 MMHG
AV VALVE AREA: 2.45 CM2
AV VELOCITY RATIO: 0.98
BSA FOR ECHO PROCEDURE: 1.48 M2
BUN SERPL-MCNC: 10 MG/DL (ref 8–23)
CALCIUM SERPL-MCNC: 8 MG/DL (ref 8.7–10.5)
CHLORIDE SERPL-SCNC: 111 MMOL/L (ref 95–110)
CO2 SERPL-SCNC: 21 MMOL/L (ref 23–29)
CREAT SERPL-MCNC: 0.9 MG/DL (ref 0.5–1.4)
CV ECHO LV RWT: 0.45 CM
DOP CALC AO PEAK VEL: 1.26 M/S
DOP CALC AO VTI: 27.3 CM
DOP CALC LVOT AREA: 2.5 CM2
DOP CALC LVOT DIAMETER: 1.79 CM
DOP CALC LVOT PEAK VEL: 1.23 M/S
DOP CALC LVOT STROKE VOLUME: 66.9 CM3
DOP CALC RVOT PEAK VEL: 0.59 M/S
DOP CALC RVOT VTI: 12.9 CM
DOP CALCLVOT PEAK VEL VTI: 26.6 CM
E WAVE DECELERATION TIME: 259.33 MSEC
E/A RATIO: 0.96
E/E' RATIO: 9.47 M/S
ECHO LV POSTERIOR WALL: 0.83 CM (ref 0.6–1.1)
EJECTION FRACTION: 60 %
EST. GFR  (NO RACE VARIABLE): >60 ML/MIN/1.73 M^2
FRACTIONAL SHORTENING: 35 % (ref 28–44)
GLUCOSE SERPL-MCNC: 101 MG/DL (ref 70–110)
INTERVENTRICULAR SEPTUM: 0.91 CM (ref 0.6–1.1)
IVC DIAMETER: 1.05 CM
IVRT: 74.22 MSEC
LA MAJOR: 3.37 CM
LA MINOR: 3.16 CM
LA WIDTH: 2 CM
LEFT ATRIUM SIZE: 2.29 CM
LEFT ATRIUM VOLUME INDEX MOD: 13.5 ML/M2
LEFT ATRIUM VOLUME INDEX: 8.6 ML/M2
LEFT ATRIUM VOLUME MOD: 19.91 CM3
LEFT ATRIUM VOLUME: 12.7 CM3
LEFT INTERNAL DIMENSION IN SYSTOLE: 2.43 CM (ref 2.1–4)
LEFT VENTRICLE DIASTOLIC VOLUME INDEX: 40.2 ML/M2
LEFT VENTRICLE DIASTOLIC VOLUME: 59.1 ML
LEFT VENTRICLE MASS INDEX: 64 G/M2
LEFT VENTRICLE SYSTOLIC VOLUME INDEX: 14.2 ML/M2
LEFT VENTRICLE SYSTOLIC VOLUME: 20.9 ML
LEFT VENTRICULAR INTERNAL DIMENSION IN DIASTOLE: 3.73 CM (ref 3.5–6)
LEFT VENTRICULAR MASS: 93.59 G
LV LATERAL E/E' RATIO: 9 M/S
LV SEPTAL E/E' RATIO: 10 M/S
LVOT MG: 2.85 MMHG
LVOT MV: 0.82 CM/S
MAGNESIUM SERPL-MCNC: 1.7 MG/DL (ref 1.6–2.6)
MV PEAK A VEL: 0.94 M/S
MV PEAK E VEL: 0.9 M/S
MV STENOSIS PRESSURE HALF TIME: 75.21 MS
MV VALVE AREA P 1/2 METHOD: 2.93 CM2
PISA TR MAX VEL: 2.16 M/S
POTASSIUM SERPL-SCNC: 3.5 MMOL/L (ref 3.5–5.1)
PV MEAN GRADIENT: 0.89 MMHG
RA MAJOR: 2.86 CM
RA PRESSURE: 3 MMHG
SODIUM SERPL-SCNC: 140 MMOL/L (ref 136–145)
STJ: 2.98 CM
TDI LATERAL: 0.1 M/S
TDI SEPTAL: 0.09 M/S
TDI: 0.1 M/S
TR MAX PG: 19 MMHG
TRICUSPID ANNULAR PLANE SYSTOLIC EXCURSION: 1.9 CM
TV REST PULMONARY ARTERY PRESSURE: 22 MMHG

## 2023-06-12 PROCEDURE — 83735 ASSAY OF MAGNESIUM: CPT | Performed by: NURSE PRACTITIONER

## 2023-06-12 PROCEDURE — G0378 HOSPITAL OBSERVATION PER HR: HCPCS

## 2023-06-12 PROCEDURE — 25000003 PHARM REV CODE 250: Performed by: INTERNAL MEDICINE

## 2023-06-12 PROCEDURE — 80048 BASIC METABOLIC PNL TOTAL CA: CPT | Performed by: NURSE PRACTITIONER

## 2023-06-12 PROCEDURE — 96361 HYDRATE IV INFUSION ADD-ON: CPT

## 2023-06-12 PROCEDURE — 25000003 PHARM REV CODE 250: Performed by: NURSE PRACTITIONER

## 2023-06-12 PROCEDURE — 36415 COLL VENOUS BLD VENIPUNCTURE: CPT | Performed by: NURSE PRACTITIONER

## 2023-06-12 RX ORDER — ACETAMINOPHEN 325 MG/1
650 TABLET ORAL EVERY 6 HOURS PRN
Status: DISCONTINUED | OUTPATIENT
Start: 2023-06-12 | End: 2023-06-12 | Stop reason: HOSPADM

## 2023-06-12 RX ORDER — ENOXAPARIN SODIUM 100 MG/ML
40 INJECTION SUBCUTANEOUS EVERY 24 HOURS
Status: DISCONTINUED | OUTPATIENT
Start: 2023-06-12 | End: 2023-06-12 | Stop reason: HOSPADM

## 2023-06-12 RX ADMIN — Medication 324 MG: at 08:06

## 2023-06-12 RX ADMIN — CETIRIZINE HYDROCHLORIDE 5 MG: 5 TABLET ORAL at 08:06

## 2023-06-12 RX ADMIN — POTASSIUM BICARBONATE 20 MEQ: 391 TABLET, EFFERVESCENT ORAL at 09:06

## 2023-06-12 RX ADMIN — LOSARTAN POTASSIUM 50 MG: 50 TABLET, FILM COATED ORAL at 08:06

## 2023-06-12 RX ADMIN — PANTOPRAZOLE SODIUM 40 MG: 40 TABLET, DELAYED RELEASE ORAL at 08:06

## 2023-06-12 RX ADMIN — FOLIC ACID 1 MG: 1 TABLET ORAL at 08:06

## 2023-06-12 RX ADMIN — ASPIRIN 81 MG: 81 TABLET, COATED ORAL at 08:06

## 2023-06-12 RX ADMIN — HYDRALAZINE HYDROCHLORIDE 25 MG: 25 TABLET, FILM COATED ORAL at 08:06

## 2023-06-12 RX ADMIN — METOPROLOL SUCCINATE 100 MG: 50 TABLET, EXTENDED RELEASE ORAL at 08:06

## 2023-06-12 RX ADMIN — ACETAMINOPHEN 650 MG: 325 TABLET ORAL at 08:06

## 2023-06-12 NOTE — ASSESSMENT & PLAN NOTE
Patient is chronically on statin.will continue for now. Last Lipid Panel:   Lab Results   Component Value Date    CHOL 153 04/14/2023    HDL 40 04/14/2023    LDLCALC 81 04/14/2023    TRIG 187 (H) 04/14/2023     Plan:  -Continue home medication  -low fat/low calorie diet

## 2023-06-12 NOTE — PLAN OF CARE
[facility] - Telemetry (Hospital)  Discharge Final Note    Primary Care Provider: [facility]    Expected Discharge Date: 6/12/2023    Final Discharge Note (most recent)       Final Note - 06/12/23 1330          Final Note    Assessment Type Final Discharge Note     Anticipated Discharge Disposition Home or Self Care     Hospital Resources/Appts/Education Provided Appointments scheduled by Navigator/Coordinator;Appointments scheduled and added to AVS        Post-Acute Status    Discharge Delays None known at this time                     Important Message from Medicare             Contact Info       Corinne Low NP   Specialty: Nurse Practitioner    [address] [address]   Phone: [phone]       Next Steps: Go on 6/16/2023    Instructions: @ 10:00am for hospital follow up

## 2023-06-12 NOTE — ASSESSMENT & PLAN NOTE
Patient has hypokalemia which is currently being treated. Last electrolytes reviewed- Recent Labs   Lab 06/11/23  1410   K 2.9*   . Will replace potassium and monitor electrolytes closely.

## 2023-06-12 NOTE — ASSESSMENT & PLAN NOTE
Appears near baseline without evidence of active bleeding noted.   Recent Labs   Lab 06/11/23  1410   HGB 11.4*   HCT 35.1*   *   MCHC 32.5   RDW 13.4   *     Plan:  -Monitor H/H and plts  -Type and screen, transfuse as needed   -Continue home medications (ferrous gluconate and folic acid)  -Hold antiplatelets/anticoagulants in setting of active bleeding/pending surgical intervention

## 2023-06-12 NOTE — SUBJECTIVE & OBJECTIVE
Past Medical History:   Diagnosis Date    Anemia     Arthritis     Cancer breast    right    Carotid artery disease     left    Coronary artery disease     Fibromyalgia     GERD (gastroesophageal reflux disease)     High cholesterol     Hypertension     Osteoporosis        Past Surgical History:   Procedure Laterality Date    BLADDER SURGERY      BREAST SURGERY      CARDIAC SURGERY       SECTION      MASTECTOMY, RADICAL Right     TUBAL LIGATION         Review of patient's allergies indicates:   Allergen Reactions    Iodine and iodide containing products Rash       No current facility-administered medications on file prior to encounter.     Current Outpatient Medications on File Prior to Encounter   Medication Sig    aspirin (ECOTRIN) 81 MG EC tablet Take 81 mg by mouth once daily.    atorvastatin (LIPITOR) 40 MG tablet Take 40 mg by mouth nightly.    ferrous gluconate (FERGON) 324 MG tablet Take 324 mg by mouth daily with breakfast.    folic acid (FOLVITE) 1 MG tablet Take 1 mg by mouth once daily.    hydrALAZINE (APRESOLINE) 25 MG tablet Take 25 mg by mouth every 12 (twelve) hours.    hydroCHLOROthiazide (HYDRODIURIL) 12.5 MG Tab Take 12.5 mg by mouth twice a week. Takes on Suns,Tues prn    loratadine (CLARITIN) 10 mg tablet Take 10 mg by mouth once daily.    losartan (COZAAR) 50 MG tablet SMARTSI Tablet(s) By Mouth Morning-Night    methotrexate 2.5 MG Tab Take 20 mg by mouth every 7 days. Takes weekly on  10mg bid    metoprolol succinate (TOPROL-XL) 100 MG 24 hr tablet Take 100 mg by mouth.    pantoprazole (PROTONIX) 40 MG tablet Take 40 mg by mouth once daily.    cholecalciferol, vitamin D3, (VITAMIN D3) 125 mcg (5,000 unit) Tab Take 5,000 Units by mouth once a week.    docusate sodium (COLACE) 100 MG capsule Take 100 mg by mouth 2 (two) times daily.    [DISCONTINUED] esomeprazole (NEXIUM) 20 MG capsule Take 20 mg by mouth nightly.    [DISCONTINUED] fluticasone (FLONASE) 50 mcg/actuation nasal  spray 2 sprays by Each Nare route once daily.    [DISCONTINUED] metoprolol succinate (TOPROL-XL) 50 MG 24 hr tablet Take 50 mg by mouth once daily.     Family History       Family history is unknown by patient.          Tobacco Use    Smoking status: Never    Smokeless tobacco: Never   Substance and Sexual Activity    Alcohol use: No     Alcohol/week: 0.0 standard drinks    Drug use: No    Sexual activity: Not on file     Review of Systems   Cardiovascular:  Positive for palpitations. Negative for chest pain and leg swelling.   Musculoskeletal:  Positive for myalgias. Negative for gait problem.   Neurological:  Positive for dizziness and light-headedness. Negative for syncope, speech difficulty, weakness and headaches.   All other systems reviewed and are negative.  Objective:     Vital Signs (Most Recent):  Temp: 98.2 °F (36.8 °C) (06/11/23 1943)  Pulse: 90 (06/11/23 2116)  Resp: 18 (06/11/23 2116)  BP: (!) 182/74 (06/11/23 1943)  SpO2: 98 % (06/11/23 2116) Vital Signs (24h Range):  Temp:  [97.7 °F (36.5 °C)-98.2 °F (36.8 °C)] 98.2 °F (36.8 °C)  Pulse:  [] 90  Resp:  [18-22] 18  SpO2:  [96 %-98 %] 98 %  BP: (136-182)/(70-74) 182/74     Weight: 52.6 kg (116 lb)  Body mass index is 22.65 kg/m².     Physical Exam  Vitals and nursing note reviewed.   Constitutional:       General: She is awake. She is not in acute distress.     Appearance: Normal appearance. She is well-developed and well-groomed. She is not ill-appearing, toxic-appearing or diaphoretic.   HENT:      Head: Normocephalic and atraumatic.   Eyes:      Extraocular Movements: Extraocular movements intact.      Conjunctiva/sclera: Conjunctivae normal.   Cardiovascular:      Rate and Rhythm: Normal rate and regular rhythm.      Heart sounds: Normal heart sounds. No murmur heard.  Pulmonary:      Effort: Pulmonary effort is normal.      Breath sounds: Normal breath sounds.   Abdominal:      General: Bowel sounds are normal.      Palpations: Abdomen is  soft.      Tenderness: There is no abdominal tenderness.   Musculoskeletal:      Cervical back: Normal range of motion and neck supple.      Comments: 5/5 strength throughout   Skin:     General: Skin is warm and dry.      Capillary Refill: Capillary refill takes less than 2 seconds.   Neurological:      General: No focal deficit present.      Mental Status: She is alert and oriented to person, place, and time. Mental status is at baseline.      GCS: GCS eye subscore is 4. GCS verbal subscore is 5. GCS motor subscore is 6.      Cranial Nerves: Cranial nerves 2-12 are intact.      Sensory: Sensation is intact.      Motor: Motor function is intact.   Psychiatric:         Mood and Affect: Mood normal.         Speech: Speech normal.         Behavior: Behavior normal. Behavior is cooperative.         LABS:  Recent Results (from the past 24 hour(s))   CBC auto differential    Collection Time: 06/11/23  2:10 PM   Result Value Ref Range    WBC 11.16 3.90 - 12.70 K/uL    RBC 3.39 (L) 4.00 - 5.40 M/uL    Hemoglobin 11.4 (L) 12.0 - 16.0 g/dL    Hematocrit 35.1 (L) 37.0 - 48.5 %     (H) 82 - 98 fL    MCH 33.6 (H) 27.0 - 31.0 pg    MCHC 32.5 32.0 - 36.0 g/dL    RDW 13.4 11.5 - 14.5 %    Platelets 466 (H) 150 - 450 K/uL    MPV 10.3 9.2 - 12.9 fL    Immature Granulocytes 0.4 0.0 - 0.5 %    Gran # (ANC) 9.2 (H) 1.8 - 7.7 K/uL    Immature Grans (Abs) 0.04 0.00 - 0.04 K/uL    Lymph # 1.1 1.0 - 4.8 K/uL    Mono # 0.7 0.3 - 1.0 K/uL    Eos # 0.0 0.0 - 0.5 K/uL    Baso # 0.04 0.00 - 0.20 K/uL    nRBC 0 0 /100 WBC    Gran % 82.4 (H) 38.0 - 73.0 %    Lymph % 10.1 (L) 18.0 - 48.0 %    Mono % 6.5 4.0 - 15.0 %    Eosinophil % 0.2 0.0 - 8.0 %    Basophil % 0.4 0.0 - 1.9 %    Differential Method Automated    Comprehensive metabolic panel    Collection Time: 06/11/23  2:10 PM   Result Value Ref Range    Sodium 141 136 - 145 mmol/L    Potassium 2.9 (L) 3.5 - 5.1 mmol/L    Chloride 103 95 - 110 mmol/L    CO2 22 (L) 23 - 29 mmol/L     Glucose 135 (H) 70 - 110 mg/dL    BUN 11 8 - 23 mg/dL    Creatinine 1.3 0.5 - 1.4 mg/dL    Calcium 9.4 8.7 - 10.5 mg/dL    Total Protein 8.0 6.0 - 8.4 g/dL    Albumin 4.1 3.5 - 5.2 g/dL    Total Bilirubin 0.4 0.1 - 1.0 mg/dL    Alkaline Phosphatase 64 55 - 135 U/L    AST 20 10 - 40 U/L    ALT 20 10 - 44 U/L    Anion Gap 16 8 - 16 mmol/L    eGFR 42.9 (A) >60 mL/min/1.73 m^2   Brain natriuretic peptide    Collection Time: 06/11/23  2:10 PM   Result Value Ref Range    BNP 70 0 - 99 pg/mL   D dimer, quantitative    Collection Time: 06/11/23  2:10 PM   Result Value Ref Range    D-Dimer 0.27 <0.50 mg/L FEU   Troponin I    Collection Time: 06/11/23  2:10 PM   Result Value Ref Range    Troponin I 0.020 0.000 - 0.026 ng/mL   Magnesium    Collection Time: 06/11/23  2:10 PM   Result Value Ref Range    Magnesium 1.5 (L) 1.6 - 2.6 mg/dL   Phosphorus    Collection Time: 06/11/23  2:10 PM   Result Value Ref Range    Phosphorus 2.9 2.7 - 4.5 mg/dL   Urinalysis, Reflex to Urine Culture Urine, Clean Catch    Collection Time: 06/11/23  3:12 PM    Specimen: Urine   Result Value Ref Range    Specimen UA Urine, Clean Catch     Color, UA Yellow Yellow, Straw, Dot    Appearance, UA Clear Clear    pH, UA 6.0 5.0 - 8.0    Specific Gravity, UA 1.015 1.005 - 1.030    Protein, UA 2+ (A) Negative    Glucose, UA Negative Negative    Ketones, UA Negative Negative    Bilirubin (UA) Negative Negative    Occult Blood UA Negative Negative    Nitrite, UA Negative Negative    Urobilinogen, UA Negative <2.0 EU/dL    Leukocytes, UA Trace (A) Negative   Urinalysis Microscopic    Collection Time: 06/11/23  3:12 PM   Result Value Ref Range    RBC, UA 2 0 - 4 /hpf    WBC, UA 7 (H) 0 - 5 /hpf    Bacteria Rare None-Occ /hpf    Hyaline Casts, UA 0 0-1/lpf /lpf    Microscopic Comment SEE COMMENT    Chloride, Random Urine    Collection Time: 06/11/23  5:19 PM   Result Value Ref Range    Chloride, Urine 95 25 - 200 mmol/L   Creatinine, Random Urine    Collection  Time: 06/11/23  5:19 PM   Result Value Ref Range    Creatinine, Urine 78.5 15.0 - 325.0 mg/dL   Potassium, Random Urine    Collection Time: 06/11/23  5:19 PM   Result Value Ref Range    Potassium, Urine 33 15 - 95 mmol/L   Sodium, Random Urine    Collection Time: 06/11/23  5:19 PM   Result Value Ref Range    Sodium, Urine 61 20 - 250 mmol/L       RADIOLOGY  X-Ray Chest AP Portable    Result Date: 6/11/2023  EXAMINATION: XR CHEST AP PORTABLE CLINICAL HISTORY: , Lightheaded; COMPARISON: Comparison 04/22/2023. FINDINGS: Heart size is normal.  Stable thickening at the right lung apex.  No lung infiltrates.     Stable chest.  Clear lungs. Electronically signed by: Vamsi Whaley MD Date:    06/11/2023 Time:    14:25      EKG    MICROBIOLOGY    MDM     Amount and/or Complexity of Data Reviewed  Clinical lab tests: reviewed  Tests in the radiology section of CPT®: reviewed  Tests in the medicine section of CPT®: reviewed  Discussion of test results with the performing providers: yes  Decide to obtain previous medical records or to obtain history from someone other than the patient: yes  Obtain history from someone other than the patient: yes  Review and summarize past medical records: yes  Discuss the patient with other providers: yes  Independent visualization of images, tracings, or specimens: yes

## 2023-06-12 NOTE — ASSESSMENT & PLAN NOTE
Magnesium reviewed- Recent Labs   Lab 06/11/23  1410   MG 1.5*    Will replace electrolytes and continue to monitor closely.

## 2023-06-12 NOTE — PLAN OF CARE
[facility] - Telemetry (Hospital)  Initial Discharge Assessment       Primary Care Provider: [facility]    Admission Diagnosis: Palpitations [R00.2]  Hypokalemia [E87.6]  Hypomagnesemia [E83.42]  Lightheaded [R42]  Abnormal EKG [R94.31]    Admission Date: 6/11/2023  Expected Discharge Date: 6/12/2023    Transition of Care Barriers: None    Payor: MEDICARE / Plan: MEDICARE PART A & B / Product Type: Government /     Extended Emergency Contact Information  Primary Emergency Contact: Mali Mariee  Address: [address]           [place] [address] [facility]  Home Phone: [phone]  Relation: Daughter    Discharge Plan A: Home         [id number] - [place], [place] - [address] AT [facility] & [place] [address]  [place] [address]  Phone: [phone] Fax: [phone]      Initial Assessment (most recent)       Adult Discharge Assessment - 06/12/23 1328          Discharge Assessment    Assessment Type Discharge Planning Assessment     Confirmed/corrected address, phone number and insurance Yes     Confirmed Demographics Correct on Facesheet     Source of Information patient     Communicated SIL with patient/caregiver Yes     Reason For Admission palpitations     People in Home alone     Facility Arrived From: [facility] ED     Do you expect to return to your current living situation? Yes     Do you have help at home or someone to help you manage your care at home? No     Prior to hospitilization cognitive status: Alert/Oriented     Current cognitive status: Alert/Oriented     Home Layout Able to live on 1st floor     Equipment Currently Used at Home none     Readmission within 30 days? No     Patient currently being followed by outpatient case management? No     Do you currently have service(s) that help you manage your care at home? No     Do you take prescription medications? Yes     Do you have prescription  coverage? Yes     Coverage [employer]     Do you have any problems affording any of your prescribed medications? No     Is the patient taking medications as prescribed? yes     Who is going to help you get home at discharge? friend     How do you get to doctors appointments? family or friend will provide     Are you on dialysis? No     Do you take coumadin? No     Discharge Plan A Home     DME Needed Upon Discharge  none     Discharge Plan discussed with: Patient;Friend     Transition of Care Barriers None                   Patient lives alone and is independent with ADL's.  She has friends who assist her with transportation to her physician appointment. No discharge needs currently.

## 2023-06-12 NOTE — ASSESSMENT & PLAN NOTE
Patient presented for eval of dizziness and palpitations. Diffrential diagnosis include reflux syncope (vasovagal) vs orthostatic/postural likely secondary to volume depletion from dehydration in setting of medications as patient currently on diuretics, BB,  ACE/ARB, vs cardiac arrhythmias/structural disease.  Plan:  -IVFs  -EKG  -Echo  -Telemetry    -Carotid dopplers   -Cards consult if warranted  -Hold HCTZ

## 2023-06-12 NOTE — ASSESSMENT & PLAN NOTE
Current BP mildly elevated likely secondary to missing night time dose of hydralazine. BP usually well controlled per patient with home medications.  Plan:  -Optimize pain control   -Continue home medications (metoprolol, hydralazine, losartan), titrate as needed   -Hold HCTZ due to dizziness   -Monitor BP  -Low salt/cardiac diet when not NPO  -IV hydralazine prn for SBP>160 or DBP>90

## 2023-06-12 NOTE — PROGRESS NOTES
Pharmacist Renal Dose Adjustment Note    Pia Mariee is a 73 y.o. female being treated with the medication enoxaparin.    Patient Data:    Vital Signs (Most Recent):  Temp: 98.2 °F (36.8 °C) (06/12/23 0721)  Pulse: 86 (06/12/23 0721)  Resp: 18 (06/12/23 0721)  BP: 138/75 (06/12/23 0721)  SpO2: 97 % (06/12/23 0721) Vital Signs (72h Range):  Temp:  [97.7 °F (36.5 °C)-98.2 °F (36.8 °C)]   Pulse:  []   Resp:  [18-22]   BP: (119-182)/(58-75)   SpO2:  [95 %-98 %]      Recent Labs   Lab 06/11/23  1410 06/12/23  0536   CREATININE 1.3 0.9     Serum creatinine: 0.9 mg/dL 06/12/23 0536  Estimated creatinine clearance: 38.8 mL/min    Enoxaparin 30 mg daily was adjusted to 40 mg daily according to [facility]'s renal dose adjustment policy      Pharmacist's Name: Delores Michaud PharmD 6/12/2023 11:16 AM  Pharmacist's Extension: [phone]

## 2023-06-12 NOTE — HPI
Pia Mariee is a 73 y.o. female with a PMH  has a past medical history of Anemia, Arthritis, Cancer (breast), Carotid artery disease, Coronary artery disease, Fibromyalgia, GERD (gastroesophageal reflux disease), High cholesterol, Hypertension, and Osteoporosis.Presents as a transfer from our [facility] for evaluation of dizziness/palpitations in treatment for electrolyte abnormalities.  Patient reports shortly after waking up this morning she started having palpitations and in dizzy.  Patient reports the 1st time she has ever felt like this before.  Denies any history of heart failure or cardiac arrhythmias or CVA/TIA.  Patient reports his symptoms were intermittent until arrival to the outside facility.  Denies any slurred speech, extremity weakness, gait instability, or any other symptoms associated with presentation.  Denies any recent illnesses or sick contacts.  Patient reports she has had frequent adjustments in her blood pressure medication, but states she has not had any recent changes which may have caused the onset of her symptoms.  However, patient states she was outside doing gardening work yesterday, but states that it was nothing strenuous in out of her norm.  Patient reports she stays well hydrated and denies any muscle cramps or generalized weakness.    ER workup performed at outside facility was mostly unremarkable with the exception to potassium level of 2.9 and magnesium level of 1.5.  BUN/creatinine was 11/1.3 with EGFR 42.9.  Remainder of blood work, UA, chest x-ray, BNP, troponin were within normal limits.  EKG revealed sinus rhythm with marked sinus arrhythmia with a ventricular rate of 98 beats per minute with ST and T-wave abnormalities noted with a QT/QTC of 388/495.  Patient received 1 g of magnesium sulfate, 40 mEq of p.o. potassium, and 10 mg tablets of potassium IV at outside facility.  Hospital Medicine consulted to admit patient for electrolyte derangement  replenishment and further workup for dizziness/palpitations.  Patient was in agreement with treatment plan.  Patient admitted under inpatient status.        PCP: Primary Doctor No

## 2023-06-12 NOTE — H&P
[facility] Medicine  History & Physical    Patient Name: Pia Mariee  MRN: [id number]  Patient Class: IP- Inpatient  Admission Date: 6/11/2023  Attending Physician: Jose F Sanders MD   Primary Care Provider: Primary Doctor No         Patient information was obtained from patient, past medical records and ER records.     Subjective:     Principal Problem:Palpitations    Chief Complaint:   Chief Complaint   Patient presents with    Palpitations     With hypertension since this morning.         HPI: Pia Mariee is a 73 y.o. female with a PMH  has a past medical history of Anemia, Arthritis, Cancer (breast), Carotid artery disease, Coronary artery disease, Fibromyalgia, GERD (gastroesophageal reflux disease), High cholesterol, Hypertension, and Osteoporosis.Presents as a transfer from our [facility] for evaluation of dizziness/palpitations in treatment for electrolyte abnormalities.  Patient reports shortly after waking up this morning she started having palpitations and in dizzy.  Patient reports the 1st time she has ever felt like this before.  Denies any history of heart failure or cardiac arrhythmias or CVA/TIA.  Patient reports his symptoms were intermittent until arrival to the outside facility.  Denies any slurred speech, extremity weakness, gait instability, or any other symptoms associated with presentation.  Denies any recent illnesses or sick contacts.  Patient reports she has had frequent adjustments in her blood pressure medication, but states she has not had any recent changes which may have caused the onset of her symptoms.  However, patient states she was outside doing gardening work yesterday, but states that it was nothing strenuous in out of her norm.  Patient reports she stays well hydrated and denies any muscle cramps or generalized weakness.    ER workup performed at outside facility was mostly unremarkable with the exception to potassium  level of 2.9 and magnesium level of 1.5.  BUN/creatinine was 11/1.3 with EGFR 42.9.  Remainder of blood work, UA, chest x-ray, BNP, troponin were within normal limits.  EKG revealed sinus rhythm with marked sinus arrhythmia with a ventricular rate of 98 beats per minute with ST and T-wave abnormalities noted with a QT/QTC of 388/495.  Patient received 1 g of magnesium sulfate, 40 mEq of p.o. potassium, and 10 mg tablets of potassium IV at outside facility.  Hospital Medicine consulted to admit patient for electrolyte derangement replenishment and further workup for dizziness/palpitations.  Patient was in agreement with treatment plan.  Patient admitted under inpatient status.        PCP: Primary Doctor No        Past Medical History:   Diagnosis Date    Anemia     Arthritis     Cancer breast    right    Carotid artery disease     left    Coronary artery disease     Fibromyalgia     GERD (gastroesophageal reflux disease)     High cholesterol     Hypertension     Osteoporosis        Past Surgical History:   Procedure Laterality Date    BLADDER SURGERY      BREAST SURGERY      CARDIAC SURGERY       SECTION      MASTECTOMY, RADICAL Right     TUBAL LIGATION         Review of patient's allergies indicates:   Allergen Reactions    Iodine and iodide containing products Rash       No current facility-administered medications on file prior to encounter.     Current Outpatient Medications on File Prior to Encounter   Medication Sig    aspirin (ECOTRIN) 81 MG EC tablet Take 81 mg by mouth once daily.    atorvastatin (LIPITOR) 40 MG tablet Take 40 mg by mouth nightly.    ferrous gluconate (FERGON) 324 MG tablet Take 324 mg by mouth daily with breakfast.    folic acid (FOLVITE) 1 MG tablet Take 1 mg by mouth once daily.    hydrALAZINE (APRESOLINE) 25 MG tablet Take 25 mg by mouth every 12 (twelve) hours.    hydroCHLOROthiazide (HYDRODIURIL) 12.5 MG Tab Take 12.5 mg by mouth twice a week. Takes on  Suns,Tues prn    loratadine (CLARITIN) 10 mg tablet Take 10 mg by mouth once daily.    losartan (COZAAR) 50 MG tablet SMARTSI Tablet(s) By Mouth Morning-Night    methotrexate 2.5 MG Tab Take 20 mg by mouth every 7 days. Takes weekly on  10mg bid    metoprolol succinate (TOPROL-XL) 100 MG 24 hr tablet Take 100 mg by mouth.    pantoprazole (PROTONIX) 40 MG tablet Take 40 mg by mouth once daily.    cholecalciferol, vitamin D3, (VITAMIN D3) 125 mcg (5,000 unit) Tab Take 5,000 Units by mouth once a week.    docusate sodium (COLACE) 100 MG capsule Take 100 mg by mouth 2 (two) times daily.    [DISCONTINUED] esomeprazole (NEXIUM) 20 MG capsule Take 20 mg by mouth nightly.    [DISCONTINUED] fluticasone (FLONASE) 50 mcg/actuation nasal spray 2 sprays by Each Nare route once daily.    [DISCONTINUED] metoprolol succinate (TOPROL-XL) 50 MG 24 hr tablet Take 50 mg by mouth once daily.     Family History       Family history is unknown by patient.          Tobacco Use    Smoking status: Never    Smokeless tobacco: Never   Substance and Sexual Activity    Alcohol use: No     Alcohol/week: 0.0 standard drinks    Drug use: No    Sexual activity: Not on file     Review of Systems   Cardiovascular:  Positive for palpitations. Negative for chest pain and leg swelling.   Musculoskeletal:  Positive for myalgias. Negative for gait problem.   Neurological:  Positive for dizziness and light-headedness. Negative for syncope, speech difficulty, weakness and headaches.   All other systems reviewed and are negative.  Objective:     Vital Signs (Most Recent):  Temp: 98.2 °F (36.8 °C) (23)  Pulse: 90 (23)  Resp: 18 (23)  BP: (!) 182/74 (23)  SpO2: 98 % (23) Vital Signs (24h Range):  Temp:  [97.7 °F (36.5 °C)-98.2 °F (36.8 °C)] 98.2 °F (36.8 °C)  Pulse:  [] 90  Resp:  [18-22] 18  SpO2:  [96 %-98 %] 98 %  BP: (136-182)/(70-74) 182/74     Weight: 52.6 kg (116 lb)  Body  mass index is 22.65 kg/m².     Physical Exam  Vitals and nursing note reviewed.   Constitutional:       General: She is awake. She is not in acute distress.     Appearance: Normal appearance. She is well-developed and well-groomed. She is not ill-appearing, toxic-appearing or diaphoretic.   HENT:      Head: Normocephalic and atraumatic.   Eyes:      Extraocular Movements: Extraocular movements intact.      Conjunctiva/sclera: Conjunctivae normal.   Cardiovascular:      Rate and Rhythm: Normal rate and regular rhythm.      Heart sounds: Normal heart sounds. No murmur heard.  Pulmonary:      Effort: Pulmonary effort is normal.      Breath sounds: Normal breath sounds.   Abdominal:      General: Bowel sounds are normal.      Palpations: Abdomen is soft.      Tenderness: There is no abdominal tenderness.   Musculoskeletal:      Cervical back: Normal range of motion and neck supple.      Comments: 5/5 strength throughout   Skin:     General: Skin is warm and dry.      Capillary Refill: Capillary refill takes less than 2 seconds.   Neurological:      General: No focal deficit present.      Mental Status: She is alert and oriented to person, place, and time. Mental status is at baseline.      GCS: GCS eye subscore is 4. GCS verbal subscore is 5. GCS motor subscore is 6.      Cranial Nerves: Cranial nerves 2-12 are intact.      Sensory: Sensation is intact.      Motor: Motor function is intact.   Psychiatric:         Mood and Affect: Mood normal.         Speech: Speech normal.         Behavior: Behavior normal. Behavior is cooperative.         LABS:  Recent Results (from the past 24 hour(s))   CBC auto differential    Collection Time: 06/11/23  2:10 PM   Result Value Ref Range    WBC 11.16 3.90 - 12.70 K/uL    RBC 3.39 (L) 4.00 - 5.40 M/uL    Hemoglobin 11.4 (L) 12.0 - 16.0 g/dL    Hematocrit 35.1 (L) 37.0 - 48.5 %     (H) 82 - 98 fL    MCH 33.6 (H) 27.0 - 31.0 pg    MCHC 32.5 32.0 - 36.0 g/dL    RDW 13.4 11.5 - 14.5  %    Platelets 466 (H) 150 - 450 K/uL    MPV 10.3 9.2 - 12.9 fL    Immature Granulocytes 0.4 0.0 - 0.5 %    Gran # (ANC) 9.2 (H) 1.8 - 7.7 K/uL    Immature Grans (Abs) 0.04 0.00 - 0.04 K/uL    Lymph # 1.1 1.0 - 4.8 K/uL    Mono # 0.7 0.3 - 1.0 K/uL    Eos # 0.0 0.0 - 0.5 K/uL    Baso # 0.04 0.00 - 0.20 K/uL    nRBC 0 0 /100 WBC    Gran % 82.4 (H) 38.0 - 73.0 %    Lymph % 10.1 (L) 18.0 - 48.0 %    Mono % 6.5 4.0 - 15.0 %    Eosinophil % 0.2 0.0 - 8.0 %    Basophil % 0.4 0.0 - 1.9 %    Differential Method Automated    Comprehensive metabolic panel    Collection Time: 06/11/23  2:10 PM   Result Value Ref Range    Sodium 141 136 - 145 mmol/L    Potassium 2.9 (L) 3.5 - 5.1 mmol/L    Chloride 103 95 - 110 mmol/L    CO2 22 (L) 23 - 29 mmol/L    Glucose 135 (H) 70 - 110 mg/dL    BUN 11 8 - 23 mg/dL    Creatinine 1.3 0.5 - 1.4 mg/dL    Calcium 9.4 8.7 - 10.5 mg/dL    Total Protein 8.0 6.0 - 8.4 g/dL    Albumin 4.1 3.5 - 5.2 g/dL    Total Bilirubin 0.4 0.1 - 1.0 mg/dL    Alkaline Phosphatase 64 55 - 135 U/L    AST 20 10 - 40 U/L    ALT 20 10 - 44 U/L    Anion Gap 16 8 - 16 mmol/L    eGFR 42.9 (A) >60 mL/min/1.73 m^2   Brain natriuretic peptide    Collection Time: 06/11/23  2:10 PM   Result Value Ref Range    BNP 70 0 - 99 pg/mL   D dimer, quantitative    Collection Time: 06/11/23  2:10 PM   Result Value Ref Range    D-Dimer 0.27 <0.50 mg/L FEU   Troponin I    Collection Time: 06/11/23  2:10 PM   Result Value Ref Range    Troponin I 0.020 0.000 - 0.026 ng/mL   Magnesium    Collection Time: 06/11/23  2:10 PM   Result Value Ref Range    Magnesium 1.5 (L) 1.6 - 2.6 mg/dL   Phosphorus    Collection Time: 06/11/23  2:10 PM   Result Value Ref Range    Phosphorus 2.9 2.7 - 4.5 mg/dL   Urinalysis, Reflex to Urine Culture Urine, Clean Catch    Collection Time: 06/11/23  3:12 PM    Specimen: Urine   Result Value Ref Range    Specimen UA Urine, Clean Catch     Color, UA Yellow Yellow, Straw, Dot    Appearance, UA Clear Clear    pH, UA  6.0 5.0 - 8.0    Specific Gravity, UA 1.015 1.005 - 1.030    Protein, UA 2+ (A) Negative    Glucose, UA Negative Negative    Ketones, UA Negative Negative    Bilirubin (UA) Negative Negative    Occult Blood UA Negative Negative    Nitrite, UA Negative Negative    Urobilinogen, UA Negative <2.0 EU/dL    Leukocytes, UA Trace (A) Negative   Urinalysis Microscopic    Collection Time: 06/11/23  3:12 PM   Result Value Ref Range    RBC, UA 2 0 - 4 /hpf    WBC, UA 7 (H) 0 - 5 /hpf    Bacteria Rare None-Occ /hpf    Hyaline Casts, UA 0 0-1/lpf /lpf    Microscopic Comment SEE COMMENT    Chloride, Random Urine    Collection Time: 06/11/23  5:19 PM   Result Value Ref Range    Chloride, Urine 95 25 - 200 mmol/L   Creatinine, Random Urine    Collection Time: 06/11/23  5:19 PM   Result Value Ref Range    Creatinine, Urine 78.5 15.0 - 325.0 mg/dL   Potassium, Random Urine    Collection Time: 06/11/23  5:19 PM   Result Value Ref Range    Potassium, Urine 33 15 - 95 mmol/L   Sodium, Random Urine    Collection Time: 06/11/23  5:19 PM   Result Value Ref Range    Sodium, Urine 61 20 - 250 mmol/L       RADIOLOGY  X-Ray Chest AP Portable    Result Date: 6/11/2023  EXAMINATION: XR CHEST AP PORTABLE CLINICAL HISTORY: , Lightheaded; COMPARISON: Comparison 04/22/2023. FINDINGS: Heart size is normal.  Stable thickening at the right lung apex.  No lung infiltrates.     Stable chest.  Clear lungs. Electronically signed by: Vamsi Whaley MD Date:    06/11/2023 Time:    14:25      EKG    MICROBIOLOGY    MDM     Amount and/or Complexity of Data Reviewed  Clinical lab tests: reviewed  Tests in the radiology section of CPT®: reviewed  Tests in the medicine section of CPT®: reviewed  Discussion of test results with the performing providers: yes  Decide to obtain previous medical records or to obtain history from someone other than the patient: yes  Obtain history from someone other than the patient: yes  Review and summarize past medical records:  yes  Discuss the patient with other providers: yes  Independent visualization of images, tracings, or specimens: yes          Assessment/Plan:     * Palpitations  Currently asymptomatic. VSS. Followed by Dr. Dewayne Walters with Cards. Scheduled to see on 8/1/23.   Plan:  -tele monitoring  -trend troponin  -Repeat ekg if warranted  -Echo in am-Cards consult if warranted  -Continue home medications (BB)    Dizziness  Patient presented for eval of dizziness and palpitations. Diffrential diagnosis include reflux syncope (vasovagal) vs orthostatic/postural likely secondary to volume depletion from dehydration in setting of medications as patient currently on diuretics, BB,  ACE/ARB, vs cardiac arrhythmias/structural disease.  Plan:  -IVFs  -EKG  -Echo  -Telemetry    -Carotid dopplers   -Cards consult if warranted  -Hold HCTZ      Hypokalemia  Patient has hypokalemia which is currently being treated. Last electrolytes reviewed- Recent Labs   Lab 06/11/23  1410   K 2.9*   . Will replace potassium and monitor electrolytes closely.         Hypomagnesemia  Magnesium reviewed- Recent Labs   Lab 06/11/23  1410   MG 1.5*    Will replace electrolytes and continue to monitor closely.           Essential hypertension  Current BP mildly elevated likely secondary to missing night time dose of hydralazine. BP usually well controlled per patient with home medications.  Plan:  -Optimize pain control   -Continue home medications (metoprolol, hydralazine, losartan), titrate as needed   -Hold HCTZ due to dizziness   -Monitor BP  -Low salt/cardiac diet when not NPO  -IV hydralazine prn for SBP>160 or DBP>90           Mixed hyperlipidemia  Patient is chronically on statin.will continue for now. Last Lipid Panel:   Lab Results   Component Value Date    CHOL 153 04/14/2023    HDL 40 04/14/2023    LDLCALC 81 04/14/2023    TRIG 187 (H) 04/14/2023     Plan:  -Continue home medication  -low fat/low calorie diet        GERD (gastroesophageal reflux  disease)  Chronic. Stable. Currently asymptomatic. Home medications include PPI/Antacids as needed.  Plan:  -Continue PPI/Antacids as needed         Other specified anemias  Appears near baseline without evidence of active bleeding noted.   Recent Labs   Lab 06/11/23  1410   HGB 11.4*   HCT 35.1*   *   MCHC 32.5   RDW 13.4   *     Plan:  -Monitor H/H and plts  -Type and screen, transfuse as needed   -Continue home medications (ferrous gluconate and folic acid)  -Hold antiplatelets/anticoagulants in setting of active bleeding/pending surgical intervention             VTE Risk Mitigation (From admission, onward)         Ordered     enoxaparin injection 30 mg  Every 24 hours         06/11/23 2011     IP VTE HIGH RISK PATIENT  Once         06/11/23 2002     Place sequential compression device  Until discontinued         06/11/23 2002              //Core Measures   -DVT proph: SCDs, lovenox   -Code status Full    -Surrogate: none    Components of this note were documented using a voice recognition system and are subject to errors not corrected at the time the document was proof read. Please contact the author for any clarifications.     Connor Rodrigues NP  Department of Hospital Medicine  [facility] Tele[facility] ([facility])

## 2023-06-12 NOTE — NURSING
Discharge summary, health teachings and instructions given to patient with friends at bedside--verbalized understanding. IV removed-no complications noted. Tele monitor removed and returned to the monitor room. All questions answered with regards to discharge instructions. Reminded of the importance of follow-up appointments.

## 2023-06-12 NOTE — ASSESSMENT & PLAN NOTE
Currently asymptomatic. VSS. Followed by Dr. Dewayne Walters with Cards. Scheduled to see on 8/1/23.   Plan:  -tele monitoring  -trend troponin  -Repeat ekg if warranted  -Echo in am-Cards consult if warranted  -Continue home medications (BB)

## 2023-06-13 ENCOUNTER — PES CALL (OUTPATIENT)
Dept: ADMINISTRATIVE | Facility: CLINIC | Age: 73
End: 2023-06-13
Payer: MEDICARE

## 2023-06-13 NOTE — DISCHARGE SUMMARY
'[facility] Telemetry ([facility] Medicine  Discharge Summary      Patient Name: Pia Mariee  MRN: [id number]  [facility]: [id number]  Patient Class: OP- Observation  Admission Date: 6/11/2023  Hospital Length of Stay: 1 days  Discharge Date and Time:  06/12/2023 7:27 PM  Attending Physician: Sue att. providers found   Discharging Provider: Christiano Small NP  Primary Care Provider: [facility]    Primary Care Team: Networked reference to record PCT     HPI:   Pia Mariee is a 73 y.o. female with a PMH  has a past medical history of Anemia, Arthritis, Cancer (breast), Carotid artery disease, Coronary artery disease, Fibromyalgia, GERD (gastroesophageal reflux disease), High cholesterol, Hypertension, and Osteoporosis.Presents as a transfer from our [facility] for evaluation of dizziness/palpitations in treatment for electrolyte abnormalities.  Patient reports shortly after waking up this morning she started having palpitations and in dizzy.  Patient reports the 1st time she has ever felt like this before.  Denies any history of heart failure or cardiac arrhythmias or CVA/TIA.  Patient reports his symptoms were intermittent until arrival to the outside facility.  Denies any slurred speech, extremity weakness, gait instability, or any other symptoms associated with presentation.  Denies any recent illnesses or sick contacts.  Patient reports she has had frequent adjustments in her blood pressure medication, but states she has not had any recent changes which may have caused the onset of her symptoms.  However, patient states she was outside doing gardening work yesterday, but states that it was nothing strenuous in out of her norm.  Patient reports she stays well hydrated and denies any muscle cramps or generalized weakness.    ER workup performed at outside facility was mostly unremarkable with the exception to potassium level of 2.9 and magnesium  level of 1.5.  BUN/creatinine was 11/1.3 with EGFR 42.9.  Remainder of blood work, UA, chest x-ray, BNP, troponin were within normal limits.  EKG revealed sinus rhythm with marked sinus arrhythmia with a ventricular rate of 98 beats per minute with ST and T-wave abnormalities noted with a QT/QTC of 388/495.  Patient received 1 g of magnesium sulfate, 40 mEq of p.o. potassium, and 10 mg tablets of potassium IV at outside facility.  Hospital Medicine consulted to admit patient for electrolyte derangement replenishment and further workup for dizziness/palpitations.  Patient was in agreement with treatment plan.  Patient admitted under inpatient status.        PCP: Primary Doctor No        * No surgery found *      Hospital Course:   Pia Mariee is a 73 year old female who was placed in observation for palpitations and postural dizziness in setting of hypokalemia and IVVD. Electrolytes were replaced and he received IVFs. Patient admits to working outside and sweating  which contributed to symptoms. Echocardiogram showed normal EF and normal diastolic function. Carotid ultrasound shows 50%  carotid stenosis. This is known to the patient. She was asked to follow up with cardiologist. Shes denies palpitations and dizziness on date of discharge. Given extra dose of potassium. She was asked to repeat labs on 6/14/23. She will be followed by [facility] at home program.        Goals of Care Treatment Preferences:  Code Status: Full Code      Consults:     No new Assessment & Plan notes have been filed under this hospital service since the last note was generated.  Service: Hospital Medicine    Final Active Diagnoses:    Diagnosis Date Noted POA    PRINCIPAL PROBLEM:  Palpitations [R00.2] 06/11/2023 Unknown    Dizziness [R42] 06/11/2023 Unknown    Hypokalemia [E87.6] 06/11/2023 Unknown    Hypomagnesemia [E83.42] 06/11/2023 Unknown    GERD (gastroesophageal reflux disease) [K21.9] 06/11/2023 Unknown    Mixed hyperlipidemia  [E78.2] 06/11/2023 Unknown    Other specified anemias [D64.89] 06/11/2023 Unknown    Essential hypertension [I10] 02/01/2016 Yes      Problems Resolved During this Admission:       Discharged Condition: stable    Disposition: Home or Self Care    Follow Up:   Follow-up Information     Corinne Low NP. Go on 6/16/2023.    Specialty: Nurse Practitioner  Why: @ 10:00am for hospital follow up  Contact information:  [address] [address] [phone]                       Patient Instructions:      BASIC METABOLIC PANEL   Standing Status: Future Standing Exp. Date: 08/10/24     Ambulatory referral/consult to [facility]   Standing Status: Future   Referral Priority: Routine Referral Type: Consultation   Referral Reason: Specialty Services Required   Number of Visits Requested: 1       Significant Diagnostic Studies: Labs:   CMP   Recent Labs   Lab 06/11/23  1410 06/12/23  0536    140   K 2.9* 3.5    111*   CO2 22* 21*   * 101   BUN 11 10   CREATININE 1.3 0.9   CALCIUM 9.4 8.0*   PROT 8.0  --    ALBUMIN 4.1  --    BILITOT 0.4  --    ALKPHOS 64  --    AST 20  --    ALT 20  --    ANIONGAP 16 8    and CBC   Recent Labs   Lab 06/11/23  1410   WBC 11.16   HGB 11.4*   HCT 35.1*   *       Pending Diagnostic Studies:     None         Medications:  Reconciled Home Medications:      Medication List      CONTINUE taking these medications    aspirin 81 MG EC tablet  Commonly known as: ECOTRIN  Take 81 mg by mouth once daily.     atorvastatin 40 MG tablet  Commonly known as: LIPITOR  Take 40 mg by mouth nightly.     docusate sodium 100 MG capsule  Commonly known as: COLACE  Take 100 mg by mouth 2 (two) times daily.     ferrous gluconate 324 MG tablet  Commonly known as: FERGON  Take 324 mg by mouth daily with breakfast.     folic acid 1 MG tablet  Commonly known as: FOLVITE  Take 1 mg by mouth once daily.     hydrALAZINE 25 MG tablet  Commonly known  as: APRESOLINE  Take 25 mg by mouth every 12 (twelve) hours.     hydroCHLOROthiazide 12.5 MG Tab  Commonly known as: HYDRODIURIL  Take 12.5 mg by mouth twice a week. Takes on Suns,Tues prn     loratadine 10 mg tablet  Commonly known as: CLARITIN  Take 10 mg by mouth once daily.     losartan 50 MG tablet  Commonly known as: COZAAR  SMARTSI Tablet(s) By Mouth Morning-Night     methotrexate 2.5 MG Tab  Take 20 mg by mouth every 7 days. Takes weekly on  10mg bid     metoprolol succinate 100 MG 24 hr tablet  Commonly known as: TOPROL-XL  Take 100 mg by mouth.     pantoprazole 40 MG tablet  Commonly known as: PROTONIX  Take 40 mg by mouth once daily.     VITAMIN D3 125 mcg (5,000 unit) Tab  Generic drug: cholecalciferol (vitamin D3)  Take 5,000 Units by mouth once a week.            Indwelling Lines/Drains at time of discharge:   Lines/Drains/Airways     None                 Time spent on the discharge of patient: >35 minutes         Christiano Small NP  Department of Hospital Medicine  '[facility] Tele[facility] ([facility])

## 2023-06-13 NOTE — HOSPITAL COURSE
Pia Mariee is a 73 year old female who was placed in observation for palpitations and postural dizziness in setting of hypokalemia and IVVD. Electrolytes were replaced and he received IVFs. Patient admits to working outside and sweating  which contributed to symptoms. Echocardiogram showed normal EF and normal diastolic function. Carotid ultrasound shows 50%  carotid stenosis. This is known to the patient. She was asked to follow up with cardiologist. Shes denies palpitations and dizziness on date of discharge. Given extra dose of potassium. She was asked to repeat labs on 6/14/23. She will be followed by NP at home program.

## 2023-06-14 ENCOUNTER — PES CALL (OUTPATIENT)
Dept: ADMINISTRATIVE | Facility: CLINIC | Age: 73
End: 2023-06-14
Payer: MEDICARE

## 2023-06-15 ENCOUNTER — OFFICE VISIT (OUTPATIENT)
Dept: HOME HEALTH SERVICES | Facility: CLINIC | Age: 73
End: 2023-06-15
Payer: MEDICARE

## 2023-06-15 VITALS
RESPIRATION RATE: 18 BRPM | TEMPERATURE: 98 F | OXYGEN SATURATION: 97 % | DIASTOLIC BLOOD PRESSURE: 60 MMHG | SYSTOLIC BLOOD PRESSURE: 150 MMHG | HEART RATE: 77 BPM

## 2023-06-15 DIAGNOSIS — R42 DIZZINESS: ICD-10-CM

## 2023-06-15 DIAGNOSIS — E87.6 HYPOKALEMIA: ICD-10-CM

## 2023-06-15 PROCEDURE — 99350 PR HOME VISIT,ESTAB PATIENT,LEVEL IV: ICD-10-PCS | Mod: S$GLB,,, | Performed by: NURSE PRACTITIONER

## 2023-06-15 PROCEDURE — 99350 HOME/RES VST EST HIGH MDM 60: CPT | Mod: S$GLB,,, | Performed by: NURSE PRACTITIONER

## 2023-09-27 ENCOUNTER — HOSPITAL ENCOUNTER (EMERGENCY)
Facility: HOSPITAL | Age: 73
Discharge: HOME | End: 2023-09-27
Attending: EMERGENCY MEDICINE
Payer: MEDICARE

## 2023-09-27 VITALS
SYSTOLIC BLOOD PRESSURE: 174 MMHG | HEART RATE: 76 BPM | DIASTOLIC BLOOD PRESSURE: 74 MMHG | HEIGHT: 60 IN | OXYGEN SATURATION: 97 % | RESPIRATION RATE: 16 BRPM | WEIGHT: 114.63 LBS | BODY MASS INDEX: 22.51 KG/M2

## 2023-09-27 DIAGNOSIS — R06.02 SOB (SHORTNESS OF BREATH): Primary | ICD-10-CM

## 2023-09-27 LAB
ALBUMIN SERPL BCP-MCNC: 4 G/DL (ref 3.5–5.2)
ALP SERPL-CCNC: 53 U/L (ref 55–135)
ALT SERPL W/O P-5'-P-CCNC: 20 U/L (ref 10–44)
ANION GAP SERPL CALC-SCNC: 14 MMOL/L (ref 8–16)
AST SERPL-CCNC: 19 U/L (ref 10–40)
BASOPHILS # BLD AUTO: 0.05 K/UL (ref 0–0.2)
BASOPHILS NFR BLD: 0.6 % (ref 0–1.9)
BILIRUB SERPL-MCNC: 0.3 MG/DL (ref 0.1–1)
BILIRUB UR QL STRIP: NEGATIVE
BNP SERPL-MCNC: 226 PG/ML (ref 0–99)
BUN SERPL-MCNC: 11 MG/DL (ref 8–23)
CALCIUM SERPL-MCNC: 9 MG/DL (ref 8.7–10.5)
CHLORIDE SERPL-SCNC: 106 MMOL/L (ref 95–110)
CK SERPL-CCNC: 115 U/L (ref 20–180)
CLARITY UR REFRACT.AUTO: CLEAR
CO2 SERPL-SCNC: 22 MMOL/L (ref 23–29)
COLOR UR AUTO: YELLOW
CREAT SERPL-MCNC: 0.9 MG/DL (ref 0.5–1.4)
DIFFERENTIAL METHOD: ABNORMAL
EOSINOPHIL # BLD AUTO: 0.1 K/UL (ref 0–0.5)
EOSINOPHIL NFR BLD: 1.1 % (ref 0–8)
ERYTHROCYTE [DISTWIDTH] IN BLOOD BY AUTOMATED COUNT: 12.9 % (ref 11.5–14.5)
EST. GFR  (NO RACE VARIABLE): >60 ML/MIN/1.73 M^2
GLUCOSE SERPL-MCNC: 113 MG/DL (ref 70–110)
GLUCOSE UR QL STRIP: NEGATIVE
HCT VFR BLD AUTO: 32.6 % (ref 37–48.5)
HGB BLD-MCNC: 10.6 G/DL (ref 12–16)
HGB UR QL STRIP: NEGATIVE
IMM GRANULOCYTES # BLD AUTO: 0.03 K/UL (ref 0–0.04)
IMM GRANULOCYTES NFR BLD AUTO: 0.4 % (ref 0–0.5)
KETONES UR QL STRIP: NEGATIVE
LEUKOCYTE ESTERASE UR QL STRIP: ABNORMAL
LYMPHOCYTES # BLD AUTO: 1.3 K/UL (ref 1–4.8)
LYMPHOCYTES NFR BLD: 16 % (ref 18–48)
MCH RBC QN AUTO: 34.4 PG (ref 27–31)
MCHC RBC AUTO-ENTMCNC: 32.5 G/DL (ref 32–36)
MCV RBC AUTO: 106 FL (ref 82–98)
MICROSCOPIC COMMENT: NORMAL
MONOCYTES # BLD AUTO: 0.8 K/UL (ref 0.3–1)
MONOCYTES NFR BLD: 9.6 % (ref 4–15)
NEUTROPHILS # BLD AUTO: 6 K/UL (ref 1.8–7.7)
NEUTROPHILS NFR BLD: 72.3 % (ref 38–73)
NITRITE UR QL STRIP: NEGATIVE
NRBC BLD-RTO: 0 /100 WBC
PH UR STRIP: 6 [PH] (ref 5–8)
PLATELET # BLD AUTO: 322 K/UL (ref 150–450)
PMV BLD AUTO: 10.5 FL (ref 9.2–12.9)
POTASSIUM SERPL-SCNC: 3.6 MMOL/L (ref 3.5–5.1)
PROT SERPL-MCNC: 7.4 G/DL (ref 6–8.4)
PROT UR QL STRIP: NEGATIVE
RBC # BLD AUTO: 3.08 M/UL (ref 4–5.4)
SODIUM SERPL-SCNC: 142 MMOL/L (ref 136–145)
SP GR UR STRIP: <=1.005 (ref 1–1.03)
TROPONIN I SERPL DL<=0.01 NG/ML-MCNC: 0.01 NG/ML (ref 0–0.03)
URN SPEC COLLECT METH UR: ABNORMAL
UROBILINOGEN UR STRIP-ACNC: NEGATIVE EU/DL
WBC # BLD AUTO: 8.27 K/UL (ref 3.9–12.7)
WBC #/AREA URNS AUTO: 1 /HPF (ref 0–5)

## 2023-09-27 PROCEDURE — 83880 ASSAY OF NATRIURETIC PEPTIDE: CPT | Mod: ER | Performed by: EMERGENCY MEDICINE

## 2023-09-27 PROCEDURE — 81000 URINALYSIS NONAUTO W/SCOPE: CPT | Mod: ER | Performed by: EMERGENCY MEDICINE

## 2023-09-27 PROCEDURE — 85025 COMPLETE CBC W/AUTO DIFF WBC: CPT | Mod: ER | Performed by: EMERGENCY MEDICINE

## 2023-09-27 PROCEDURE — 82550 ASSAY OF CK (CPK): CPT | Mod: ER | Performed by: EMERGENCY MEDICINE

## 2023-09-27 PROCEDURE — 80053 COMPREHEN METABOLIC PANEL: CPT | Mod: ER | Performed by: EMERGENCY MEDICINE

## 2023-09-27 PROCEDURE — 84484 ASSAY OF TROPONIN QUANT: CPT | Mod: ER | Performed by: EMERGENCY MEDICINE

## 2023-09-27 PROCEDURE — 99285 EMERGENCY DEPT VISIT HI MDM: CPT | Mod: 25,ER

## 2023-09-27 RX ORDER — ERGOCALCIFEROL 1.25 MG/1
50000 CAPSULE ORAL
COMMUNITY
Start: 2023-09-11 | End: 2024-03-09

## 2023-09-27 RX ORDER — DOCUSATE SODIUM 100 MG/1
100 CAPSULE, LIQUID FILLED ORAL 3 TIMES DAILY PRN
Qty: 30 CAPSULE | Refills: 0 | Status: SHIPPED | OUTPATIENT
Start: 2023-09-27

## 2023-09-27 NOTE — DISCHARGE INSTRUCTIONS
Increase iron supplement to three times a day for a week.  Take HCTZ 4 times per week instead of 2 times per week for the next week, and follow up with PCP in one week.

## 2023-09-27 NOTE — ED PROVIDER NOTES
Encounter Date: 2023       History     Chief Complaint   Patient presents with    Shortness of Breath     SOB with chest tightness x 3 days. No fever or CP reported.     The history is provided by the patient.   Shortness of Breath  This is a chronic problem. The current episode started more than 1 week ago. The problem has not changed since onset.Pertinent negatives include no fever, no sore throat, no chest pain and no rash.     Review of patient's allergies indicates:   Allergen Reactions    Ciprofloxacin     Klonopin [clonazepam]     Opioids - morphine analogues     Plaquenil [hydroxychloroquine]     Shellfish containing products     Iodine and iodide containing products Rash     Past Medical History:   Diagnosis Date    Anemia     Arthritis     Cancer breast    right    Carotid artery disease     left    Coronary artery disease     Fibromyalgia     GERD (gastroesophageal reflux disease)     High cholesterol     Hypertension     Osteoporosis      Past Surgical History:   Procedure Laterality Date    BLADDER SURGERY      BREAST SURGERY      CARDIAC SURGERY       SECTION      MASTECTOMY, RADICAL Right     TUBAL LIGATION       Family History   Family history unknown: Yes     Social History     Tobacco Use    Smoking status: Never    Smokeless tobacco: Never   Substance Use Topics    Alcohol use: No     Alcohol/week: 0.0 standard drinks of alcohol    Drug use: No     Review of Systems   Constitutional:  Negative for fever.   HENT:  Negative for sore throat.    Respiratory:  Positive for shortness of breath.    Cardiovascular:  Negative for chest pain.   Gastrointestinal:  Negative for nausea.   Genitourinary:  Negative for dysuria.   Musculoskeletal:  Negative for back pain.   Skin:  Negative for rash.   Neurological:  Negative for weakness.   Hematological:  Does not bruise/bleed easily.       Physical Exam     Initial Vitals   BP Pulse Resp Temp SpO2   23 1208 23 1020 23 1021 --  09/27/23 1020   (!) 174/74 94 18  98 %      MAP       --                Physical Exam    Nursing note and vitals reviewed.  Constitutional: She appears well-developed and well-nourished. No distress.   HENT:   Head: Normocephalic and atraumatic.   Mouth/Throat: Oropharynx is clear and moist.   Eyes: Conjunctivae and EOM are normal. Pupils are equal, round, and reactive to light.   Neck: Neck supple.   Normal range of motion.  Cardiovascular:  Normal rate, regular rhythm and normal heart sounds.     Exam reveals no gallop and no friction rub.       No murmur heard.  Pulmonary/Chest: Breath sounds normal. No respiratory distress. She has no wheezes. She has no rhonchi. She has no rales.   Abdominal: Abdomen is soft. Bowel sounds are normal. She exhibits no distension and no mass. There is no abdominal tenderness. There is no rebound and no guarding.   Musculoskeletal:         General: No tenderness or edema. Normal range of motion.      Cervical back: Normal range of motion and neck supple.     Neurological: She is alert and oriented to person, place, and time. She has normal strength.   Skin: Skin is warm and dry. No rash noted.   Psychiatric: She has a normal mood and affect. Thought content normal.         ED Course   Procedures  Labs Reviewed   CBC W/ AUTO DIFFERENTIAL - Abnormal; Notable for the following components:       Result Value    RBC 3.08 (*)     Hemoglobin 10.6 (*)     Hematocrit 32.6 (*)      (*)     MCH 34.4 (*)     Lymph % 16.0 (*)     All other components within normal limits   COMPREHENSIVE METABOLIC PANEL - Abnormal; Notable for the following components:    CO2 22 (*)     Glucose 113 (*)     Alkaline Phosphatase 53 (*)     All other components within normal limits   URINALYSIS, REFLEX TO URINE CULTURE - Abnormal; Notable for the following components:    Specific Gravity, UA <=1.005 (*)     Leukocytes, UA Trace (*)     All other components within normal limits    Narrative:     Specimen  Source->Urine   B-TYPE NATRIURETIC PEPTIDE - Abnormal; Notable for the following components:     (*)     All other components within normal limits   CK   TROPONIN I   URINALYSIS MICROSCOPIC    Narrative:     Specimen Source->Urine     EKG Readings: (Independently Interpreted)   Rhythm: Normal Sinus Rhythm. Heart Rate: 94. Ectopy: No Ectopy. Conduction: Normal. ST Segments: Normal ST Segments. T Waves: Normal. Clinical Impression: Normal Sinus Rhythm       Imaging Results              X-Ray Chest AP Portable (Final result)  Result time 09/27/23 10:50:30      Final result by Dayne Lim MD (09/27/23 10:50:30)                   Impression:      No acute finding in the chest.      Electronically signed by: Dayne Lim  Date:    09/27/2023  Time:    10:50               Narrative:    EXAMINATION:  XR CHEST AP PORTABLE    CLINICAL HISTORY:  sob;    FINDINGS:  Comparison: 06/11/2023    Mediastinal silhouette is within normal limits.  The lungs are clear.  No pneumothorax or pleural effusion.  No acute osseous finding.  Right axillary surgical clips.                                       Medications - No data to display  Medical Decision Making  Shortness of breath for the last several months.  Recently saw her cardiologist and had echo and stress test  Ddx: CHF, anemia      Problems Addressed:  SOB (shortness of breath): acute illness or injury    Amount and/or Complexity of Data Reviewed  Labs: ordered.     Details: Bnp 226, hemoglobin 10.6  Radiology: ordered.    Risk  OTC drugs.                               Clinical Impression:   Final diagnoses:  [R06.02] SOB (shortness of breath) (Primary)        ED Disposition Condition    Discharge Stable          ED Prescriptions       Medication Sig Dispense Start Date End Date Auth. Provider    docusate sodium (COLACE) 100 MG capsule Take 1 capsule (100 mg total) by mouth 3 (three) times daily as needed for Constipation. 30 capsule 9/27/2023 -- Anthony Abdul,  MD          Follow-up Information       Follow up With Specialties Details Why Contact Info Additional Information    [facility] [address] [facility] Schedule an appointment as soon as possible for a visit   [id number] [facility] [id number] [phone] Please park on the [address] side and use the Clinic entrance. Check in on the 2nd floor, to the right.             Anthony Abdul MD  09/27/23 [id number]

## 2023-12-07 NOTE — PROGRESS NOTES
Thank you for allowing us to care for you today. You may receive a survey about the care we provided. Your feedback is valuable and helps us provide excellent care throughout the community.     Home Care Instructions for Pain Management:    1. DIET:   You may resume your normal diet today.   2. BATHING:   You may shower with luke warm water. No tub baths or anything that will soak injection sites under water for the next 24 hours.  3. DRESSING:   You may remove your bandage today.   4. ACTIVITY LEVEL:   You may resume your normal activities 24 hrs after your procedure. Nothing strenuous today.  5. MEDICATIONS:   You may resume your normal medications today. To restart blood thinners, ask your doctor.  6. DRIVING    If you have received any sedatives by mouth today, you may not drive for 12 hours.    If you have received any sedation through your IV, you may not drive for 24 hrs.   7. SPECIAL INSTRUCTIONS:   No heat to the injection site for 24 hrs including, hot bath or shower, heating pad, moist heat, or hot tubs.    Use ice pack to injection site for any pain or discomfort.  Apply ice packs for 20 minute intervals as needed.    IF you have diabetes, be sure to monitor your blood sugar more closely. IF your injection contained steroids your blood sugar levels may become higher than normal.    If you are still having pain upon discharge:  Your pain may improve over the next 48 hours. The anesthetic (numbing medication) works immediately to 48 hours. IF your injection contained a steroid (anti-inflammatory medication), it takes approximately 3 days to start feeling relief and 7-10 days to see your greatest results from the medication. It is possible you may need subsequent injections. This would be discussed at your follow up appointment with pain management or your referring doctor.    Please call the PAIN MANAGEMENT office at [phone] or ON CALL pager at [phone] if you experienced any:   -Weakness or    [facility]  Transition of Care Home Visit    Visit Date: 6/15/2023  Encounter Provider: Vania Mehta   PCP:  [facility]    PRESENTING HISTORY      Patient ID: Pia Mariee is a 73 y.o. female.    Consult Requested By:  Christiano Small  Reason for Consult:  Hospital Follow Up.    Pia is being seen at home due to being seen at home due to physical debility that presents a taxing effort to leave the home, to mitigate high risk of hospital readmission and/or due to the limited availability of reliable or safe options for transportation to the point of access to the provider. Prior to treatment on this visit the chart was reviewed and patient verbal consent was obtained.      Chief Complaint: Transitional Care    Patient was admitted to hospital on 06/11 and discharged to home on 06/12    History of Present Illness: Ms. Pia Mariee is a 73 y.o. female who was recently admitted to the hospital  with past medical history of breast cancer, coronary artery disease, osteoporosis, hypertension, high cholesterol presents to the emergency department with complaints of palpitations.  This started this morning.  Patient reports she awoke lightheaded and has felt palpitations throughout the morning.  Patient checked her blood pressure several times, but review of these numbers show normal blood pressures.  Patient additionally is been more short of breath than usual for several months.  Patient denies any chest pain, injury, abdominal pain, cough, congestion.      ___________________________________________________________________    Today:    HPI:  Patient is being seen today for a transitional care visit. See hospital course for details. Patient reports that she has been feeling much better since her discharge from the hospital. Patient denies any dizziness, lightheaded, and palpitations. Patient reports that she has readjusted her diet to include more food  loss of sensation  -Fever > 101.5  -Pain uncontrolled with oral medications   -Persistent nausea, vomiting, or diarrhea  -Redness or drainage from the injection sites, or any other worrisome concerns.   If physician on call was not reached or could not communicate with our office for any reason please go to the nearest emergency department.    with potassium ( fruits and vegetables). Medications reviewed and patient reports compliance to all medications. VSS.          Review of Systems   Constitutional:  Positive for fatigue.   HENT:  Positive for rhinorrhea.    Eyes: Negative.    Respiratory:  Positive for shortness of breath (with exertion). Negative for cough and chest tightness.    Cardiovascular:  Positive for leg swelling (at times).   Gastrointestinal: Negative.    Endocrine: Negative.    Genitourinary: Negative.    Musculoskeletal:  Positive for arthralgias and myalgias.   Allergic/Immunologic: Positive for immunocompromised state (2/2 medication).   Neurological:  Negative for dizziness, weakness and light-headedness.   Hematological: Negative.    Psychiatric/Behavioral:  Positive for sleep disturbance. Negative for agitation and decreased concentration. The patient is not nervous/anxious.      Assessments:  Environmental: Patient lives in a ground level flat. There are no steps at the entrance. Lighting is bright and temperature is comfortable.  Functional Status: Patient is ambulatory and independent with ADLs  Safety: Fall Precautions  Nutritional: Adequate food in the home  Home Health/DME/Supplies: NO home health, DMEs: none    PAST HISTORY:     Past Medical History:   Diagnosis Date    Anemia     Arthritis     Cancer breast    right    Carotid artery disease     left    Coronary artery disease     Fibromyalgia     GERD (gastroesophageal reflux disease)     High cholesterol     Hypertension     Osteoporosis        Past Surgical History:   Procedure Laterality Date    BLADDER SURGERY      BREAST SURGERY      CARDIAC SURGERY       SECTION      MASTECTOMY, RADICAL Right     TUBAL LIGATION         Family History   Family history unknown: Yes       Social History     Socioeconomic History    Marital status: Single   Tobacco Use    Smoking status: Never    Smokeless tobacco: Never   Substance and Sexual Activity    Alcohol use: No      Alcohol/week: 0.0 standard drinks    Drug use: No       MEDICATIONS & ALLERGIES:     Current Outpatient Medications on File Prior to Visit   Medication Sig Dispense Refill    aspirin (ECOTRIN) 81 MG EC tablet Take 81 mg by mouth once daily.      atorvastatin (LIPITOR) 40 MG tablet Take 40 mg by mouth nightly.      ferrous gluconate (FERGON) 324 MG tablet Take 324 mg by mouth daily with breakfast.      folic acid (FOLVITE) 1 MG tablet Take 1 mg by mouth once daily.      hydrALAZINE (APRESOLINE) 25 MG tablet Take 25 mg by mouth every 12 (twelve) hours.      loratadine (CLARITIN) 10 mg tablet Take 10 mg by mouth once daily.      losartan (COZAAR) 50 MG tablet SMARTSI Tablet(s) By Mouth Morning-Night      methotrexate 2.5 MG Tab Take 20 mg by mouth every 7 days. Takes weekly on  10mg bid      metoprolol succinate (TOPROL-XL) 100 MG 24 hr tablet Take 100 mg by mouth.      pantoprazole (PROTONIX) 40 MG tablet Take 40 mg by mouth once daily.      cholecalciferol, vitamin D3, (VITAMIN D3) 125 mcg (5,000 unit) Tab Take 5,000 Units by mouth once a week.      hydroCHLOROthiazide (HYDRODIURIL) 12.5 MG Tab Take 12.5 mg by mouth twice a week. Takes on Suns,Tues prn      [DISCONTINUED] docusate sodium (COLACE) 100 MG capsule Take 100 mg by mouth 2 (two) times daily.       No current facility-administered medications on file prior to visit.        Review of patient's allergies indicates:   Allergen Reactions    Iodine and iodide containing products Rash       OBJECTIVE:     Vital Signs:  Vitals:    06/15/23 1040   BP: (!) 150/60   Pulse: 77   Resp: 18   Temp: 97.9 °F (36.6 °C)     There is no height or weight on file to calculate BMI.     Physical Exam:  Physical Exam  Vitals reviewed.   Constitutional:       General: She is not in acute distress.     Appearance: She is normal weight.   HENT:      Head: Normocephalic and atraumatic.      Nose: Rhinorrhea present.      Mouth/Throat:      Mouth: Mucous membranes are moist.       Pharynx: Oropharynx is clear.   Eyes:      Pupils: Pupils are equal, round, and reactive to light.   Cardiovascular:      Rate and Rhythm: Normal rate and regular rhythm.      Pulses: Normal pulses.      Heart sounds: Normal heart sounds.   Pulmonary:      Effort: Pulmonary effort is normal.      Breath sounds: Normal breath sounds.   Abdominal:      General: Bowel sounds are normal. There is no distension.      Palpations: Abdomen is soft.      Tenderness: There is no abdominal tenderness. There is no guarding.   Musculoskeletal:         General: Normal range of motion.      Cervical back: Neck supple.      Right lower leg: No edema.      Left lower leg: No edema.   Skin:     General: Skin is warm and dry.      Capillary Refill: Capillary refill takes less than 2 seconds.   Neurological:      Mental Status: She is alert and oriented to person, place, and time.      Motor: No weakness.      Gait: Gait normal.   Psychiatric:         Mood and Affect: Mood normal.         Behavior: Behavior normal.         Thought Content: Thought content normal.         Judgment: Judgment normal.       Laboratory  Lab Results   Component Value Date    WBC 11.16 06/11/2023    HGB 11.4 (L) 06/11/2023    HCT 35.1 (L) 06/11/2023     (H) 06/11/2023     (H) 06/11/2023     No results found for: INR, PROTIME  Lab Results   Component Value Date    HGBA1C 6.0 (H) 04/14/2023     No results for input(s): POCTGLUCOSE in the last 72 hours.    Diagnostic Results:      TRANSITION OF CARE:     [facility] Contact Note: 06/14/2023    Family and/or Caretaker present at visit?  Yes.  Diagnostic tests reviewed/disposition: No diagnosic tests pending after this hospitalization.  Disease/illness education: HTN, Palpitations  Home health/community services discussion/referrals: Patient does not have home health established from hospital visit.  They do not need home health.  If needed, we will set up home health for the patient.    Establishment or re-establishment of referral orders for community resources: No other necessary community resources.   Discussion with other health care providers: No discussion with other health care providers necessary.     Transition of Care Visit:  I have reviewed and updated the history and problem list.  I have reconciled the medication list.  I have discussed the hospitalization and current medical issues, prognosis and plans with the patient/family.  I  spent more than 50% of time discussing the care with the patient/family.  Total Face-to-Face Encounter: 60 minutes.    Medications Reconciliation:   I have reconciled the patient's home medications and discharge medications with the patient/family. I have updated all changes.  Refer to After-Visit Medication List.    ASSESSMENT & PLAN:     HIGH RISK CONDITION(S):  HTN, HLD, Hypokalemia, Hypomagnesium, Anemia        1. Hypokalemia  -     Ambulatory referral/consult to [facility]    2. Dizziness  -     Ambulatory referral/consult to [facility]       Encounter for Medical Follow-Up and Medication Review  - [facility] to schedule follow-up visit with patient in 4 weeks or PRN     Patient Instructions Given:  - Continue all medications, treatments and therapies as ordered.   - Follow all instructions, recommendations as discussed.  - Maintain Safety Precautions at all times.  - Attend all medical appointments as scheduled.  - For worsening symptoms: call Primary Care Physician or Nurse Practitioner.  - For emergencies, call 911 or immediately report to the nearest emergency room     Were controlled substances prescribed?  No    Instructions for the patient:    Scheduled Follow-up :  No future appointments.      After Visit Medication List :     Medication List            Accurate as of Leeanna 15, 2023  4:43 PM. If you have any questions, ask your nurse or doctor.                 CONTINUE taking these medications      aspirin 81 MG EC tablet  Commonly known as: ECOTRIN     atorvastatin 40 MG tablet  Commonly known as: LIPITOR     ferrous gluconate 324 MG tablet  Commonly known as: FERGON     folic acid 1 MG tablet  Commonly known as: FOLVITE     hydrALAZINE 25 MG tablet  Commonly known as: APRESOLINE     hydroCHLOROthiazide 12.5 MG Tab  Commonly known as: HYDRODIURIL     loratadine 10 mg tablet  Commonly known as: CLARITIN     losartan 50 MG tablet  Commonly known as: COZAAR     methotrexate 2.5 MG Tab     metoprolol succinate 100 MG 24 hr tablet  Commonly known as: TOPROL-XL     pantoprazole 40 MG tablet  Commonly known as: PROTONIX     VITAMIN D3 125 mcg (5,000 unit) Tab  Generic drug: cholecalciferol (vitamin D3)              Signature: Vania Mehta NP

## 2024-01-04 ENCOUNTER — HOSPITAL ENCOUNTER (EMERGENCY)
Facility: HOSPITAL | Age: 74
Discharge: HOME | End: 2024-01-04
Attending: EMERGENCY MEDICINE
Payer: MEDICARE

## 2024-01-04 VITALS
BODY MASS INDEX: 22.43 KG/M2 | RESPIRATION RATE: 20 BRPM | SYSTOLIC BLOOD PRESSURE: 145 MMHG | WEIGHT: 114.88 LBS | HEART RATE: 89 BPM | DIASTOLIC BLOOD PRESSURE: 82 MMHG | OXYGEN SATURATION: 97 % | TEMPERATURE: 98 F

## 2024-01-04 DIAGNOSIS — M54.2 NECK PAIN: Primary | ICD-10-CM

## 2024-01-04 DIAGNOSIS — R91.8 MASS OF UPPER LOBE OF RIGHT LUNG: ICD-10-CM

## 2024-01-04 PROCEDURE — 99284 EMERGENCY DEPT VISIT MOD MDM: CPT | Mod: 25,ER

## 2024-01-04 PROCEDURE — 25000003 PHARM REV CODE 250: Mod: ER | Performed by: EMERGENCY MEDICINE

## 2024-01-04 RX ORDER — CYCLOBENZAPRINE HCL 5 MG
5 TABLET ORAL
Status: COMPLETED | OUTPATIENT
Start: 2024-01-04 | End: 2024-01-04

## 2024-01-04 RX ORDER — HYDROCODONE BITARTRATE AND ACETAMINOPHEN 5; 325 MG/1; MG/1
1 TABLET ORAL
Status: COMPLETED | OUTPATIENT
Start: 2024-01-04 | End: 2024-01-04

## 2024-01-04 RX ORDER — CYCLOBENZAPRINE HCL 5 MG
5 TABLET ORAL 3 TIMES DAILY PRN
Qty: 15 TABLET | Refills: 0 | OUTPATIENT
Start: 2024-01-04 | End: 2024-01-06

## 2024-01-04 RX ORDER — HYDROCODONE BITARTRATE AND ACETAMINOPHEN 5; 325 MG/1; MG/1
1 TABLET ORAL EVERY 6 HOURS PRN
Qty: 12 TABLET | Refills: 0 | OUTPATIENT
Start: 2024-01-04 | End: 2024-01-06

## 2024-01-04 RX ADMIN — HYDROCODONE BITARTRATE AND ACETAMINOPHEN 1 TABLET: 5; 325 TABLET ORAL at 09:01

## 2024-01-04 RX ADMIN — CYCLOBENZAPRINE HYDROCHLORIDE 5 MG: 5 TABLET, FILM COATED ORAL at 09:01

## 2024-01-05 NOTE — ED PROVIDER NOTES
Encounter Date: 2024       History     Chief Complaint   Patient presents with    Neck Pain     Left side of neck since she woke up this morning.      The history is provided by the patient.   Neck Pain   This is a new problem. The current episode started today. The problem occurs constantly. The problem has been unchanged. The pain is associated with nothing. The pain is present in the left side. The quality of the pain is described as aching. The pain radiates to the left shoulder. The pain is at a severity of 8/10. Associated symptoms include photophobia. Pertinent negatives include no visual change, no chest pain, no syncope, no numbness, no weight loss, no headaches, no bowel incontinence, no bladder incontinence, no leg pain, no paresis, no tingling and no weakness.     Review of patient's allergies indicates:   Allergen Reactions    Ciprofloxacin     Klonopin [clonazepam]     Opioids - morphine analogues     Plaquenil [hydroxychloroquine]     Shellfish containing products     Iodine and iodide containing products Rash     Past Medical History:   Diagnosis Date    Anemia     Arthritis     Cancer breast    right    Carotid artery disease     left    Coronary artery disease     Fibromyalgia     GERD (gastroesophageal reflux disease)     High cholesterol     Hypertension     Osteoporosis      Past Surgical History:   Procedure Laterality Date    BLADDER SURGERY      BREAST SURGERY      CARDIAC SURGERY       SECTION      MASTECTOMY, RADICAL Right     TUBAL LIGATION       Family History   Family history unknown: Yes     Social History     Tobacco Use    Smoking status: Never    Smokeless tobacco: Never   Substance Use Topics    Alcohol use: No     Alcohol/week: 0.0 standard drinks of alcohol    Drug use: No     Review of Systems   Constitutional:  Negative for weight loss.   Eyes:  Positive for photophobia.   Cardiovascular:  Negative for chest pain and syncope.   Gastrointestinal:  Negative for bowel  incontinence.   Genitourinary:  Negative for bladder incontinence.   Musculoskeletal:  Positive for neck pain.   Neurological:  Negative for tingling, weakness, numbness and headaches.       Physical Exam     Initial Vitals   BP Pulse Resp Temp SpO2   01/04/24 2105 01/04/24 2104 01/04/24 2104 01/04/24 2104 01/04/24 2104   (!) 187/80 95 16 98.1 °F (36.7 °C) 96 %      MAP       --                Physical Exam    Nursing note and vitals reviewed.  Constitutional: She appears well-developed and well-nourished. No distress.   HENT:   Head: Normocephalic and atraumatic.   Mouth/Throat: Oropharynx is clear and moist.   Eyes: Conjunctivae and EOM are normal. Pupils are equal, round, and reactive to light.   Neck: Neck supple.   Normal range of motion.  Cardiovascular:  Normal rate, regular rhythm and normal heart sounds.           Pulmonary/Chest: Breath sounds normal. No respiratory distress.   Abdominal: Abdomen is soft. Bowel sounds are normal. She exhibits no distension. There is no abdominal tenderness.   Musculoskeletal:         General: Normal range of motion.      Cervical back: Normal range of motion and neck supple. Spasms present.      Thoracic back: Normal.      Lumbar back: Normal.     Neurological: She is alert and oriented to person, place, and time. She has normal strength.   Skin: Skin is warm and dry.   Psychiatric: She has a normal mood and affect. Thought content normal.         ED Course   Procedures  Labs Reviewed - No data to display       Imaging Results              CT Cervical Spine Without Contrast (Final result)  Result time 01/04/24 21:48:57      Final result by Jani Aparicio MD (01/04/24 21:48:57)                   Impression:      No acute fracture or dislocation.    Mild degenerative joint disease    Right apical nodular mass measuring 2.5 cm.  Recommend follow-up.    All CT scans   are performed using dose optimization techniques including the following: automated exposure control;  adjustment of the mA and/or kV; use of iterative reconstruction technique.  Dose modulation was employed for ALARA by means of: Automated exposure control; adjustment of the mA and/or kV according to patient size (this includes techniques or standardized protocols for targeted exams where dose is matched to indication/reason for exam; i.e. extremities or head); and/or use of iterative reconstructive technique.      Electronically signed by: Jani Aparicio  Date:    01/04/2024  Time:    21:48               Narrative:    EXAMINATION:  CT CERVICAL SPINE WITHOUT CONTRAST    CLINICAL HISTORY:  Neck pain, acute, no red flags;    TECHNIQUE:  Low dose axial images, sagittal and coronal reformations were performed though the cervical spine.  Contrast was not administered.    COMPARISON:  None    FINDINGS:  Normal vertebral body heights without evidence for spondylolisthesis.  Mild multilevel degenerative disc disease..  No prevertebral soft tissue swelling.  Facet joints are congruent.  Normal bone mineral density.  Right apical scarring with a nodular mass measuring 2.5 x 2.5 cm                                  10:20 PM - Counseling: Spoke with the patient and discussed todays findings, in addition to providing specific details for the plan of care and counseling regarding the diagnosis and prognosis. Questions are answered at this time. Discussed right apical lung mass and need for close follow up.       Medications   HYDROcodone-acetaminophen 5-325 mg per tablet 1 tablet (1 tablet Oral Given 1/4/24 2130)   cyclobenzaprine tablet 5 mg (5 mg Oral Given 1/4/24 2130)     Medical Decision Making  Problems Addressed:  Mass of upper lobe of right lung: acute illness or injury  Neck pain: acute illness or injury    Amount and/or Complexity of Data Reviewed  Radiology: ordered.    Risk  Prescription drug management.                                      Clinical Impression:  Final diagnoses:  [M54.2] Neck pain (Primary)  [R91.8]  Mass of upper lobe of right lung          ED Disposition Condition    Discharge Stable          ED Prescriptions       Medication Sig Dispense Start Date End Date Auth. Provider    HYDROcodone-acetaminophen (NORCO) 5-325 mg per tablet Take 1 tablet by mouth every 6 (six) hours as needed. 12 tablet 1/4/2024 -- Xavi Antunez MD    cyclobenzaprine (FLEXERIL) 5 MG tablet Take 1 tablet (5 mg total) by mouth 3 (three) times daily as needed for Muscle spasms. 15 tablet 1/4/2024 1/14/2024 Xavi Antunez MD          Follow-up Information       Follow up With Specialties Details Why Contact Info Additional Information    PCP  Call in 2 days       [facility] - Emergency Dept Emergency Medicine  If symptoms worsen [id number] [address] [address] [phone]     [facility] Hematology and Oncology Schedule an appointment as soon as possible for a visit  for evaluation of apical lung mass [id number] [facility] [address] [phone] [address] [address] [facility] and park in the surface lot. Check in on the 2nd floor.             Xavi Antunez MD  01/04/24 [id number]

## 2024-01-06 ENCOUNTER — HOSPITAL ENCOUNTER (EMERGENCY)
Facility: HOSPITAL | Age: 74
Discharge: HOME | End: 2024-01-06
Attending: EMERGENCY MEDICINE
Payer: MEDICARE

## 2024-01-06 ENCOUNTER — TELEPHONE (OUTPATIENT)
Dept: EMERGENCY MEDICINE | Facility: HOSPITAL | Age: 74
End: 2024-01-06
Payer: MEDICARE

## 2024-01-06 VITALS
TEMPERATURE: 98 F | DIASTOLIC BLOOD PRESSURE: 67 MMHG | WEIGHT: 114 LBS | BODY MASS INDEX: 22.26 KG/M2 | HEART RATE: 93 BPM | OXYGEN SATURATION: 96 % | RESPIRATION RATE: 18 BRPM | SYSTOLIC BLOOD PRESSURE: 153 MMHG

## 2024-01-06 DIAGNOSIS — D72.829 LEUKOCYTOSIS, UNSPECIFIED TYPE: ICD-10-CM

## 2024-01-06 DIAGNOSIS — R93.89 ABNORMAL CHEST X-RAY: ICD-10-CM

## 2024-01-06 DIAGNOSIS — M54.2 NECK PAIN: Primary | ICD-10-CM

## 2024-01-06 DIAGNOSIS — R55 SYNCOPE: ICD-10-CM

## 2024-01-06 DIAGNOSIS — D64.9 ANEMIA, UNSPECIFIED TYPE: ICD-10-CM

## 2024-01-06 LAB
ALBUMIN SERPL BCP-MCNC: 4 G/DL (ref 3.5–5.2)
ALP SERPL-CCNC: 45 U/L (ref 55–135)
ALT SERPL W/O P-5'-P-CCNC: 15 U/L (ref 10–44)
ANION GAP SERPL CALC-SCNC: 14 MMOL/L (ref 8–16)
AST SERPL-CCNC: 16 U/L (ref 10–40)
BASOPHILS # BLD AUTO: 0.03 K/UL (ref 0–0.2)
BASOPHILS NFR BLD: 0.2 % (ref 0–1.9)
BILIRUB SERPL-MCNC: 0.8 MG/DL (ref 0.1–1)
BILIRUB UR QL STRIP: NEGATIVE
BNP SERPL-MCNC: 93 PG/ML (ref 0–99)
BUN SERPL-MCNC: 15 MG/DL (ref 8–23)
CALCIUM SERPL-MCNC: 9.7 MG/DL (ref 8.7–10.5)
CHLORIDE SERPL-SCNC: 97 MMOL/L (ref 95–110)
CLARITY UR REFRACT.AUTO: CLEAR
CO2 SERPL-SCNC: 25 MMOL/L (ref 23–29)
COLOR UR AUTO: YELLOW
CREAT SERPL-MCNC: 1 MG/DL (ref 0.5–1.4)
DIFFERENTIAL METHOD BLD: ABNORMAL
EOSINOPHIL # BLD AUTO: 0 K/UL (ref 0–0.5)
EOSINOPHIL NFR BLD: 0.1 % (ref 0–8)
ERYTHROCYTE [DISTWIDTH] IN BLOOD BY AUTOMATED COUNT: 12.8 % (ref 11.5–14.5)
EST. GFR  (NO RACE VARIABLE): 58.8 ML/MIN/1.73 M^2
GLUCOSE SERPL-MCNC: 127 MG/DL (ref 70–110)
GLUCOSE UR QL STRIP: NEGATIVE
HCT VFR BLD AUTO: 33.4 % (ref 37–48.5)
HGB BLD-MCNC: 10.9 G/DL (ref 12–16)
HGB UR QL STRIP: NEGATIVE
IMM GRANULOCYTES # BLD AUTO: 0.08 K/UL (ref 0–0.04)
IMM GRANULOCYTES NFR BLD AUTO: 0.5 % (ref 0–0.5)
KETONES UR QL STRIP: NEGATIVE
LEUKOCYTE ESTERASE UR QL STRIP: NEGATIVE
LYMPHOCYTES # BLD AUTO: 0.5 K/UL (ref 1–4.8)
LYMPHOCYTES NFR BLD: 3.6 % (ref 18–48)
MCH RBC QN AUTO: 34.5 PG (ref 27–31)
MCHC RBC AUTO-ENTMCNC: 32.6 G/DL (ref 32–36)
MCV RBC AUTO: 106 FL (ref 82–98)
MONOCYTES # BLD AUTO: 0.5 K/UL (ref 0.3–1)
MONOCYTES NFR BLD: 3.1 % (ref 4–15)
NEUTROPHILS # BLD AUTO: 13.7 K/UL (ref 1.8–7.7)
NEUTROPHILS NFR BLD: 92.5 % (ref 38–73)
NITRITE UR QL STRIP: NEGATIVE
NRBC BLD-RTO: 0 /100 WBC
PH UR STRIP: 7 [PH] (ref 5–8)
PLATELET # BLD AUTO: 320 K/UL (ref 150–450)
PMV BLD AUTO: 10.8 FL (ref 9.2–12.9)
POTASSIUM SERPL-SCNC: 4 MMOL/L (ref 3.5–5.1)
PROT SERPL-MCNC: 7.5 G/DL (ref 6–8.4)
PROT UR QL STRIP: NEGATIVE
RBC # BLD AUTO: 3.16 M/UL (ref 4–5.4)
SODIUM SERPL-SCNC: 136 MMOL/L (ref 136–145)
SP GR UR STRIP: 1.01 (ref 1–1.03)
TROPONIN I SERPL DL<=0.01 NG/ML-MCNC: <0.006 NG/ML (ref 0–0.03)
TSH SERPL DL<=0.005 MIU/L-ACNC: 1.64 UIU/ML (ref 0.4–4)
URN SPEC COLLECT METH UR: NORMAL
UROBILINOGEN UR STRIP-ACNC: <2 EU/DL
WBC # BLD AUTO: 14.86 K/UL (ref 3.9–12.7)

## 2024-01-06 PROCEDURE — 85025 COMPLETE CBC W/AUTO DIFF WBC: CPT | Mod: ER | Performed by: EMERGENCY MEDICINE

## 2024-01-06 PROCEDURE — 25000003 PHARM REV CODE 250: Mod: ER | Performed by: EMERGENCY MEDICINE

## 2024-01-06 PROCEDURE — 99285 EMERGENCY DEPT VISIT HI MDM: CPT | Mod: 25,ER

## 2024-01-06 PROCEDURE — 93010 ELECTROCARDIOGRAM REPORT: CPT | Mod: ,,, | Performed by: INTERNAL MEDICINE

## 2024-01-06 PROCEDURE — 83880 ASSAY OF NATRIURETIC PEPTIDE: CPT | Mod: ER | Performed by: EMERGENCY MEDICINE

## 2024-01-06 PROCEDURE — 80053 COMPREHEN METABOLIC PANEL: CPT | Mod: ER | Performed by: EMERGENCY MEDICINE

## 2024-01-06 PROCEDURE — 96360 HYDRATION IV INFUSION INIT: CPT | Mod: ER

## 2024-01-06 PROCEDURE — 84443 ASSAY THYROID STIM HORMONE: CPT | Mod: ER | Performed by: EMERGENCY MEDICINE

## 2024-01-06 PROCEDURE — 93005 ELECTROCARDIOGRAM TRACING: CPT | Mod: ER

## 2024-01-06 PROCEDURE — 81003 URINALYSIS AUTO W/O SCOPE: CPT | Mod: ER | Performed by: EMERGENCY MEDICINE

## 2024-01-06 PROCEDURE — 84484 ASSAY OF TROPONIN QUANT: CPT | Mod: ER | Performed by: EMERGENCY MEDICINE

## 2024-01-06 RX ORDER — METHYLPREDNISOLONE 4 MG/1
TABLET ORAL
Qty: 1 EACH | Refills: 0 | Status: SHIPPED | OUTPATIENT
Start: 2024-01-06 | End: 2024-01-27

## 2024-01-06 RX ORDER — MELOXICAM 7.5 MG/1
7.5 TABLET ORAL DAILY
Qty: 30 TABLET | Refills: 1 | Status: SHIPPED | OUTPATIENT
Start: 2024-01-06 | End: 2024-03-06

## 2024-01-06 RX ORDER — LIDOCAINE 50 MG/G
1 PATCH TOPICAL DAILY PRN
Qty: 15 PATCH | Refills: 0 | Status: SHIPPED | OUTPATIENT
Start: 2024-01-06 | End: 2024-01-21

## 2024-01-06 RX ORDER — TRAMADOL HYDROCHLORIDE 50 MG/1
50 TABLET ORAL EVERY 12 HOURS PRN
Qty: 20 EACH | Refills: 0 | Status: SHIPPED | OUTPATIENT
Start: 2024-01-06

## 2024-01-06 RX ORDER — LIDOCAINE 50 MG/G
1 PATCH TOPICAL
Status: DISCONTINUED | OUTPATIENT
Start: 2024-01-06 | End: 2024-01-06 | Stop reason: HOSPADM

## 2024-01-06 RX ORDER — TRAMADOL HYDROCHLORIDE 50 MG/1
50 TABLET ORAL
Status: COMPLETED | OUTPATIENT
Start: 2024-01-06 | End: 2024-01-06

## 2024-01-06 RX ORDER — LIDOCAINE 50 MG/G
1 PATCH TOPICAL DAILY PRN
Qty: 15 PATCH | Refills: 0 | Status: SHIPPED | OUTPATIENT
Start: 2024-01-06 | End: 2024-01-06

## 2024-01-06 RX ADMIN — TRAMADOL HYDROCHLORIDE 50 MG: 50 TABLET, COATED ORAL at 09:01

## 2024-01-06 RX ADMIN — SODIUM CHLORIDE 500 ML: 9 INJECTION, SOLUTION INTRAVENOUS at 04:01

## 2024-01-06 RX ADMIN — LIDOCAINE 1 PATCH: 50 PATCH CUTANEOUS at 07:01

## 2024-01-06 NOTE — TELEPHONE ENCOUNTER
[facility] does not have 5% Lidoderm patches.  Lidoderm patch prescription sent to [facility] in plaque min as requested per patient.

## 2024-01-06 NOTE — ED PROVIDER NOTES
Encounter Date: 2024       History     Chief Complaint   Patient presents with    Dizziness     Pt reports feeling faint and dizzy upon going to restroom \[facility] [place] [facility]\"". Pt denies LOC. Pt also reports neck pain.       The history is provided by the patient.   Dizziness  This is a new problem. The current episode started less than 1 hour ago. The problem occurs constantly. The problem has not changed since onset.Associated symptoms include headaches. Pertinent negatives include no chest pain, no abdominal pain and no shortness of breath. The symptoms are aggravated by standing.   Pt reports syncope prior to arrival while going to bathroom.    Review of patient's allergies indicates:   Allergen Reactions    Ciprofloxacin     Klonopin [clonazepam]     Opioids - morphine analogues     Plaquenil [hydroxychloroquine]     Shellfish containing products     Iodine and iodide containing products Rash     Past Medical History:   Diagnosis Date    Anemia     Arthritis     Cancer breast    right    Carotid artery disease     left    Coronary artery disease     Fibromyalgia     GERD (gastroesophageal reflux disease)     High cholesterol     Hypertension     Osteoporosis      Past Surgical History:   Procedure Laterality Date    BLADDER SURGERY      BREAST SURGERY      CARDIAC SURGERY       SECTION      MASTECTOMY, RADICAL Right     TUBAL LIGATION       Family History   Family history unknown: Yes     Social History     Tobacco Use    Smoking status: Never    Smokeless tobacco: Never   Substance Use Topics    Alcohol use: No     Alcohol/week: 0.0 standard drinks of alcohol    Drug use: No     Review of Systems   Constitutional:  Negative for fever.   HENT:  Negative for sore throat.    Respiratory:  Negative for shortness of breath.    Cardiovascular:  Negative for chest pain.   Gastrointestinal:  Negative for abdominal pain and nausea.   Genitourinary:  Negative for dysuria.   Musculoskeletal:  Negative for back pain.   Skin:   Negative for rash.   Neurological:  Positive for dizziness, syncope and headaches. Negative for weakness.   Hematological:  Does not bruise/bleed easily.       Physical Exam     Initial Vitals   BP Pulse Resp Temp SpO2   01/06/24 0328 01/06/24 0328 01/06/24 0328 01/06/24 0618 01/06/24 0328   (!) 145/65 96 19 98.4 °F (36.9 °C) 96 %      MAP       --                Physical Exam    Nursing note and vitals reviewed.  Constitutional: She appears well-developed and well-nourished. No distress.   Elderly frail   HENT:   Head: Normocephalic.   Mouth/Throat: Oropharynx is clear and moist.   Frontal hematoma   Eyes: Conjunctivae and EOM are normal. Pupils are equal, round, and reactive to light.   Neck: Neck supple.   Normal range of motion.  Cardiovascular:  Normal rate, regular rhythm and normal heart sounds.           Pulmonary/Chest: Breath sounds normal. No respiratory distress.   Abdominal: Abdomen is soft. Bowel sounds are normal. She exhibits no distension. There is no abdominal tenderness.   Musculoskeletal:         General: Normal range of motion.      Cervical back: Normal range of motion and neck supple.     Neurological: She is alert and oriented to person, place, and time. She has normal strength.   Skin: Skin is warm and dry.   Psychiatric: She has a normal mood and affect. Thought content normal.         ED Course   Procedures  Labs Reviewed   CBC W/ AUTO DIFFERENTIAL - Abnormal; Notable for the following components:       Result Value    WBC 14.86 (*)     RBC 3.16 (*)     Hemoglobin 10.9 (*)     Hematocrit 33.4 (*)      (*)     MCH 34.5 (*)     Gran # (ANC) 13.7 (*)     Immature Grans (Abs) 0.08 (*)     Lymph # 0.5 (*)     Gran % 92.5 (*)     Lymph % 3.6 (*)     Mono % 3.1 (*)     All other components within normal limits   COMPREHENSIVE METABOLIC PANEL - Abnormal; Notable for the following components:    Glucose 127 (*)     Alkaline Phosphatase 45 (*)     eGFR 58.8 (*)     All other components within  normal limits   B-TYPE NATRIURETIC PEPTIDE   TROPONIN I   TSH   URINALYSIS, REFLEX TO URINE CULTURE    Narrative:     Specimen Source->Urine     EKG Readings: (Independently Interpreted)   Rhythm: Normal Sinus Rhythm. Heart Rate: 94. ST Segments: Non-Specific ST Segment Depression. T Waves: Normal. Axis: Normal. Clinical Impression: Normal Sinus Rhythm     ECG Results              EKG 12-lead (Preliminary result)  Result time 01/06/24 09:05:15      Wet Read by Tawanna Adkins DO (01/06/24 09:05:15, [facility] Emergency Dept, Emergency Medicine)    EKG timed 333 a.m. on January 6.  Rate of 94 beats per minute.  Sinus rhythm.  Normal axis.  No ST segment elevation.  No STEMI.  Nonspecific changes.  Normal QTC                                  Results for orders placed or performed during the hospital encounter of 01/06/24   CBC Auto Differential   Result Value Ref Range    WBC 14.86 (H) 3.90 - 12.70 K/uL    RBC 3.16 (L) 4.00 - 5.40 M/uL    Hemoglobin 10.9 (L) 12.0 - 16.0 g/dL    Hematocrit 33.4 (L) 37.0 - 48.5 %     (H) 82 - 98 fL    MCH 34.5 (H) 27.0 - 31.0 pg    MCHC 32.6 32.0 - 36.0 g/dL    RDW 12.8 11.5 - 14.5 %    Platelets 320 150 - 450 K/uL    MPV 10.8 9.2 - 12.9 fL    Immature Granulocytes 0.5 0.0 - 0.5 %    Gran # (ANC) 13.7 (H) 1.8 - 7.7 K/uL    Immature Grans (Abs) 0.08 (H) 0.00 - 0.04 K/uL    Lymph # 0.5 (L) 1.0 - 4.8 K/uL    Mono # 0.5 0.3 - 1.0 K/uL    Eos # 0.0 0.0 - 0.5 K/uL    Baso # 0.03 0.00 - 0.20 K/uL    nRBC 0 0 /100 WBC    Gran % 92.5 (H) 38.0 - 73.0 %    Lymph % 3.6 (L) 18.0 - 48.0 %    Mono % 3.1 (L) 4.0 - 15.0 %    Eosinophil % 0.1 0.0 - 8.0 %    Basophil % 0.2 0.0 - 1.9 %    Differential Method Automated    Comprehensive Metabolic Panel   Result Value Ref Range    Sodium 136 136 - 145 mmol/L    Potassium 4.0 3.5 - 5.1 mmol/L    Chloride 97 95 - 110 mmol/L    CO2 25 23 - 29 mmol/L    Glucose 127 (H) 70 - 110 mg/dL    BUN 15 8 - 23 mg/dL    Creatinine 1.0 0.5 - 1.4 mg/dL    Calcium  9.7 8.7 - 10.5 mg/dL    Total Protein 7.5 6.0 - 8.4 g/dL    Albumin 4.0 3.5 - 5.2 g/dL    Total Bilirubin 0.8 0.1 - 1.0 mg/dL    Alkaline Phosphatase 45 (L) 55 - 135 U/L    AST 16 10 - 40 U/L    ALT 15 10 - 44 U/L    eGFR 58.8 (A) >60 mL/min/1.73 m^2    Anion Gap 14 8 - 16 mmol/L   BNP   Result Value Ref Range    BNP 93 0 - 99 pg/mL   Troponin I   Result Value Ref Range    Troponin I <0.006 0.000 - 0.026 ng/mL   TSH   Result Value Ref Range    TSH 1.643 0.400 - 4.000 uIU/mL   Urinalysis, Reflex to Urine Culture Urine, Clean Catch    Specimen: Urine   Result Value Ref Range    Specimen UA Urine, Clean Catch     Color, UA Yellow Yellow, Straw, Dot    Appearance, UA Clear Clear    pH, UA 7.0 5.0 - 8.0    Specific Gravity, UA 1.015 1.005 - 1.030    Protein, UA Negative Negative    Glucose, UA Negative Negative    Ketones, UA Negative Negative    Bilirubin (UA) Negative Negative    Occult Blood UA Negative Negative    Nitrite, UA Negative Negative    Urobilinogen, UA <2.0 <2.0 EU/dL    Leukocytes, UA Negative Negative       Imaging Results              CT Chest Without Contrast (Final result)  Result time 01/06/24 07:48:20      Final result by Kelton Mares III, MD (01/06/24 07:48:20)                   Impression:      Prior right mastectomy.  Underlying benign-appearing right apical pleural thickening and right upper lobe scarring most likely representing radiation fibrosis.  No definite acute or suspicious abnormality identified in the chest.      Electronically signed by: Kelton Mares MD  Date:    01/06/2024  Time:    07:48               Narrative:    EXAMINATION:  CT CHEST WITHOUT CONTRAST    CLINICAL HISTORY:  Abnormal xray - lung nodule, < 1 cm, low risk;right upper lobe mass;    TECHNIQUE:  Axial images through the chest were obtained without the use of IV contrast. All CT scans at this facility use dose modulation, iterative reconstruction, and/or weight based dosing when appropriate to reduce radiation  dose to as low as reasonably achievable.    COMPARISON:  No prior chest CT available    FINDINGS:  Prior right mastectomy.  2 x 2 cm benign-appearing focus of pleural thickening in the lateral right lung apex with internal calcifications likely related to radiation from prior breast cancer..  Patchy discoid atelectasis and scarring in the medial right upper lobe, likely radiation fibrosis.  The lungs are otherwise clear. There is no acute appearing infiltrate, pleural effusion, pneumothorax, suspicious nodule or suspicious lung mass.    No pathologic lymphadenopathy is seen in the chest. Coronary artery calcifications are noted.  There is no significant cardiomegaly or pericardial effusion. No aortic aneurysm identified. Negative for acute fracture or suspicious osseous lesions. No acute disease seen in the upper abdomen.                                       CT Cervical Spine Without Contrast (Final result)  Result time 01/06/24 05:44:37      Final result by Kristopher Suárez MD (01/06/24 05:44:37)                   Impression:     Multilevel degenerative disc disease unchanged from 01/04/2024.  No acute fractures.  Right apical pulmonary mass not entirely included on the study.  Again follow-up of this mass is recommended.    All CT scans at [this location] are performed using dose modulation techniques as appropriate to a performed exam including the following:  Automated exposure control; adjustment of the mA and/or kV according to patient size (this includes techniques or standardized protocols for targeted exams where dose is matched to indication / reason for exam; i.e. extremities or head); use of iterative reconstruction technique.    Finalized on: 1/6/2024 5:44 AM By:  Kristopher Suárez MD  [facility]# [id number]      2024-01-06 05:46:45.021    [facility]               Narrative:    EXAM: CT CERVICAL SPINE WITHOUT CONTRAST    CLINICAL HISTORY: Trauma, cervical spine pain.    COMPARISON: Examination dated 01/04/2024.    TECHNIQUE:  Standard thin-section axial images, with reformatted sagittal and coronal images.    FINDINGS: No fractures identified.  Mild multilevel degenerative disc disease and arthritic changes involving the facets unchanged from 01/04/2024.  Patient demonstrated right apical pulmonary mass not entirely included on the study.                                         CT Head Without Contrast (Final result)  Result time 01/06/24 05:46:31      Final result by Kristopher Suárez MD (01/06/24 05:46:31)                   Impression:     No acute intracranial findings.    All CT scans at [this location] are performed using dose modulation techniques as appropriate to a performed exam including the following:  Automated exposure control; adjustment of the mA and/or kV according to patient size (this includes techniques or standardized protocols for targeted exams where dose is matched to indication / reason for exam; i.e. extremities or head); use of iterative reconstruction technique.    Finalized on: 1/6/2024 5:46 AM By:  Kristopher Suárez MD  [facility]# [id number]      2024-01-06 05:48:35.129    [facility]               Narrative:    EXAM:  CT HEAD WITHOUT CONTRAST    CLINICAL HISTORY: Syncope    TECHNIQUE: Noncontrast CT scan of the head was performed.    FINDINGS: No intracranial hemorrhage or acute intracranial abnormality is identified.  Intracranial atherosclerosis.  Mild mucoperiosteal thickening in the ethmoid air cells.  The remaining visualized paranasal sinuses and mastoid air cells are clear.  The calvarium is intact.                                         X-Ray Chest 1 View (Final result)  Result time 01/06/24 07:49:24      Final result by Kelton Mares III, MD (01/06/24 07:49:24)                   Impression:      See above.      Electronically signed by: Kelton Mares MD  Date:    01/06/2024  Time:    07:49               Narrative:    EXAMINATION:  XR CHEST 1 VIEW    CLINICAL HISTORY:  Syncope and collapse    FINDINGS:  Comparison is  made with the most recent prior chest x-ray.  The cardiomediastinal silhouette is within normal limits for AP technique.  Prior right mastectomy with right axillary surgical clips.  Stable mild right apical pleural thickening and right upper lobe scarring likely radiation fibrosis..  No acute appearing infiltrate, pleural effusion or pneumothorax identified.                        Wet Read by Tawanna Adkins DO (01/06/24 07:16:34, [facility] Dept, Emergency Medicine)    Right apical mass.  No pneumothorax.  Cardiac silhouette size normal in appearance                      Wet Read by Tawanna Adkins DO (01/06/24 06:17:50, [facility], Emergency Medicine)    Naf.                      Wet Read by Xavi Antunez MD (01/06/24 05:12:48, [facility] Dept, Emergency Medicine)    naf                                     Medications   sodium chloride 0.9% bolus 500 mL 500 mL (0 mLs Intravenous Stopped 1/6/24 0546)   traMADoL tablet 50 mg (50 mg Oral Given 1/6/24 0912)     Medical Decision Making  Differential Diagnosis:  Hypoglycemia, symptomatic anemia, medication reaction to recent cyclobenzaprine and Norco use, cancer    Mild leukocytosis 14.86.  Left shift present.  Stable hemoglobin/hematocrit 10.9/33.4.  Previous hemoglobin 10 0.2/31.9.  Urinalysis negative for infection.  CMP normal. Troponin negative. BNP less than 93, TSH normal.  Head CT negative for acute intracranial pathology.  CT scan neck questionable right apical mass.  Patient underwent CT scan chest:  Prior mastectomy.  Underlying benign-appearing right apical pleural thickening and right upper lobe scarring most likely representing radiation fibrosis.     Patient's most likely etiology is taking the Flexeril and Norco leading to adverse drug reaction.  Patient was encouraged to stop Flexeril and Norco.  Prescription for Ultram as well as Lidoderm patch.  Give short course of Medrol Dosepak.  Risk  benefits discussed with patient in detail.    Amount and/or Complexity of Data Reviewed  Labs: ordered. Decision-making details documented in ED Course.  Radiology: ordered and independent interpretation performed. Decision-making details documented in ED Course.  ECG/medicine tests: ordered and independent interpretation performed. Decision-making details documented in ED Course.    Risk  Prescription drug management.  Risk Details: Discharge Medication List as of 1/6/2024  9:07 AM    START taking these medications    meloxicam (MOBIC) 7.5 MG tablet  Take 1 tablet (7.5 mg total) by mouth once daily. for 60 doses, Starting Sat 1/6/2024, Until Wed 3/6/2024, Normal    methylPREDNISolone (MEDROL DOSEPACK) 4 mg tablet  As directed, Normal    traMADoL (ULTRAM) 50 mg tablet  Take 1 tablet (50 mg total) by mouth every 12 (twelve) hours as needed for Pain., Starting Sat 1/6/2024, Normal    LIDOcaine (LIDODERM) 5 %  Place 1 patch onto the skin daily as needed (neck pain). Remove & Discard patch within 12 hours or as directed by MD, Starting Sat 1/6/2024, Until Sun 1/21/2024 at 2359, Normal                       ED Course as of 01/06/24 1513   Sat Jan 06, 2024   0600 Care turned over from Dr. EDGAR Antunez.  [LB]   0616 WBC(!): 14.86 [LB]   0616 Hemoglobin(!): 10.9 [LB]   0616 Hematocrit(!): 33.4 [LB]   0710 Patient reports that she was seen here in the emergency department on January 4, 2024.  She had been having neck pain that started that date.  She underwent a CT of the neck which showed arthritis as well as a right upper lobe mass.  She has an appointment with her primary care physician for further evaluation.  Patient denies any history of working with asbestos, working in factory/ship yd, history of tobacco abuse.  Patient denies any loss of control of bowel or bladder, perirectal paresthesias, weakness of the upper extremity, night sweats, weight loss, difficulty talking.  She reports that she had been taking her  "Flexeril 5 mg every 8 hours as well as her Norco 5 mg.  Last took it about 5:00 pm  last night.  Patient reports that somehow she wound up on the floor.  She denies any specific preceding chest pain, chest pressure, calf pain, calf tenderness, shortness of breath, headache.  She notes that she has neck pain.  Aggravated by movement.  Patient denies any headache, nausea, vomiting, difficulty breathing, shortness of breath, fever, chills, cough, dysuria, urgency, frequency, blood in stool, black tarry stool.  On exam patient is awake alert oriented.  GCS is 15.  No hemotympanum.  Midface stable.  No midline cervical neck tenderness.  There is tenderness to the lower neck musculoskeletal.  There is no masses palpated.  There is no T-spine, L-spine tenderness.  Heart regular rate and rhythm without murmur.  Lungs clear to auscultation.  Abdomen soft no rebound or guarding no masses.  GCS 15.  Intact median, ulnar, and radial nerves both motor and sensory.  Strength equal.  +to biceps reflex.  No calf pain, calf swelling.  Patient does have known history rheumatoid arthritis.  She reports that she gets "angry" when she is on steroids.   [LB]   0714 We discussed pain medication treatment options including narcotics, muscle relaxers, Lidoderm patches, nonsteroidal anti-inflammatories, or Ultram.  Patient does not have any history renal insufficiency or peptic ulcers.  Consider adding Mobic to patient's pain management.  Recommend stopping the muscle relaxer as well as stopping the narcotic.  Patient is not on any SSRIs.  Consider Ultram.  Shared decision-making with patient regarding medications.  Patient is agreeable for a short course of steroids as this could be an aggravation of her arthritis as well.  We discussed unclear etiology of the apical lung mass an absolute need for follow-up.  Patient elected to have a non contrasted CT.   [LB]   0744 Patient able to ambulate to the bathroom unassisted and wash hands.  " Walked back to the exam room [LB]   0802 Impression:     Prior right mastectomy.  Underlying benign-appearing right apical pleural thickening and right upper lobe scarring most likely representing radiation fibrosis.  No definite acute or suspicious abnormality identified in the chest.        Electronically signed by: Kelton Mares MD  Date:                                            01/06/2024  Time:                                           07:48        Exam Ended: 01/06/24 07:24 CST         [LB]   0905 Discussed discharge instructions.  Recommended stop the Flexeril and Norco.  Will provide tramadol, Mobic,lidoderm patch [LB]      ED Course User Index  [LB] Tawanna Adkins DO                           Clinical Impression:  Final diagnoses:  [R55] Syncope, near  [M54.2] Neck pain (Primary)  [R93.89] Abnormal chest x-ray  [D72.829] Leukocytosis, unspecified type  [D64.9] Anemia, unspecified type          ED Disposition Condition    Discharge Stable          ED Prescriptions       Medication Sig Dispense Start Date End Date Auth. Provider    LIDOcaine (LIDODERM) 5 % (Expires today) Place 1 patch onto the skin daily as needed (neck pain). Remove & Discard patch within 12 hours or as directed by MD 15 patch 1/6/2024 1/6/2024 Tawanna Adkins, DO    traMADoL (ULTRAM) 50 mg tablet Take 1 tablet (50 mg total) by mouth every 12 (twelve) hours as needed for Pain. 20 each 1/6/2024 -- Tawanna Adkins DO    methylPREDNISolone (MEDROL DOSEPACK) 4 mg tablet As directed 1 each 1/6/2024 1/27/2024 Tawanna Adkins DO    meloxicam (MOBIC) 7.5 MG tablet Take 1 tablet (7.5 mg total) by mouth once daily. for 60 doses 30 tablet 1/6/2024 3/6/2024 Tawanna Adkins DO          Follow-up Information       Follow up With Specialties Details Why Contact [facility]   Or your primary care physician.  Return to emergency department for unable to urinate, numbness in the rectal  region/private region, rash, fever, chest pain, chest pressure, shortness of breath, difficulty breathing, or worsening condition [address] [facility] [id number] [phone]               Tawanna Adkins,   01/06/24 1511

## 2024-01-06 NOTE — DISCHARGE INSTRUCTIONS
mpression:     Prior right mastectomy.  Underlying benign-appearing right apical pleural thickening and right upper lobe scarring most likely representing radiation fibrosis.  No definite acute or suspicious abnormality identified in the chest.        Electronically signed by: Kelton Mares MD  Date:                                            01/06/2024  Time:                                           07:48

## 2024-03-03 ENCOUNTER — HOSPITAL ENCOUNTER (EMERGENCY)
Facility: HOSPITAL | Age: 74
Discharge: HOME | End: 2024-03-03
Attending: EMERGENCY MEDICINE
Payer: MEDICARE

## 2024-03-03 VITALS
HEIGHT: 60 IN | RESPIRATION RATE: 20 BRPM | OXYGEN SATURATION: 96 % | WEIGHT: 115 LBS | DIASTOLIC BLOOD PRESSURE: 81 MMHG | TEMPERATURE: 98 F | SYSTOLIC BLOOD PRESSURE: 175 MMHG | BODY MASS INDEX: 22.58 KG/M2 | HEART RATE: 97 BPM

## 2024-03-03 DIAGNOSIS — R00.0 SINUS TACHYCARDIA: Primary | ICD-10-CM

## 2024-03-03 DIAGNOSIS — R07.9 CHEST PAIN: ICD-10-CM

## 2024-03-03 LAB
ALBUMIN SERPL BCP-MCNC: 4 G/DL (ref 3.5–5.2)
ALP SERPL-CCNC: 49 U/L (ref 55–135)
ALT SERPL W/O P-5'-P-CCNC: 19 U/L (ref 10–44)
ANION GAP SERPL CALC-SCNC: 17 MMOL/L (ref 8–16)
AST SERPL-CCNC: 20 U/L (ref 10–40)
BASOPHILS # BLD AUTO: 0.05 K/UL (ref 0–0.2)
BASOPHILS NFR BLD: 0.7 % (ref 0–1.9)
BILIRUB SERPL-MCNC: 0.8 MG/DL (ref 0.1–1)
BNP SERPL-MCNC: 49 PG/ML (ref 0–99)
BUN SERPL-MCNC: 11 MG/DL (ref 8–23)
CALCIUM SERPL-MCNC: 9.5 MG/DL (ref 8.7–10.5)
CHLORIDE SERPL-SCNC: 100 MMOL/L (ref 95–110)
CO2 SERPL-SCNC: 20 MMOL/L (ref 23–29)
CREAT SERPL-MCNC: 1 MG/DL (ref 0.5–1.4)
D DIMER PPP IA.FEU-MCNC: 0.27 MG/L FEU
DIFFERENTIAL METHOD BLD: ABNORMAL
EOSINOPHIL # BLD AUTO: 0 K/UL (ref 0–0.5)
EOSINOPHIL NFR BLD: 0.4 % (ref 0–8)
ERYTHROCYTE [DISTWIDTH] IN BLOOD BY AUTOMATED COUNT: 13.7 % (ref 11.5–14.5)
EST. GFR  (NO RACE VARIABLE): 58.8 ML/MIN/1.73 M^2
GLUCOSE SERPL-MCNC: 137 MG/DL (ref 70–110)
HCT VFR BLD AUTO: 32.5 % (ref 37–48.5)
HEP C VIRUS HOLD SPECIMEN: NORMAL
HGB BLD-MCNC: 10.7 G/DL (ref 12–16)
IMM GRANULOCYTES # BLD AUTO: 0.04 K/UL (ref 0–0.04)
IMM GRANULOCYTES NFR BLD AUTO: 0.5 % (ref 0–0.5)
LACTATE SERPL-SCNC: 2.2 MMOL/L (ref 0.5–2.2)
LYMPHOCYTES # BLD AUTO: 0.9 K/UL (ref 1–4.8)
LYMPHOCYTES NFR BLD: 11.7 % (ref 18–48)
MCH RBC QN AUTO: 34.7 PG (ref 27–31)
MCHC RBC AUTO-ENTMCNC: 32.9 G/DL (ref 32–36)
MCV RBC AUTO: 106 FL (ref 82–98)
MONOCYTES # BLD AUTO: 0.8 K/UL (ref 0.3–1)
MONOCYTES NFR BLD: 10.3 % (ref 4–15)
NEUTROPHILS # BLD AUTO: 5.8 K/UL (ref 1.8–7.7)
NEUTROPHILS NFR BLD: 76.4 % (ref 38–73)
NRBC BLD-RTO: 0 /100 WBC
PLATELET # BLD AUTO: 327 K/UL (ref 150–450)
PMV BLD AUTO: 9.5 FL (ref 9.2–12.9)
POTASSIUM SERPL-SCNC: 3.4 MMOL/L (ref 3.5–5.1)
PROT SERPL-MCNC: 7.1 G/DL (ref 6–8.4)
RBC # BLD AUTO: 3.08 M/UL (ref 4–5.4)
SODIUM SERPL-SCNC: 137 MMOL/L (ref 136–145)
TROPONIN I SERPL DL<=0.01 NG/ML-MCNC: 0.02 NG/ML (ref 0–0.03)
WBC # BLD AUTO: 7.59 K/UL (ref 3.9–12.7)

## 2024-03-03 PROCEDURE — 83605 ASSAY OF LACTIC ACID: CPT | Mod: ER | Performed by: EMERGENCY MEDICINE

## 2024-03-03 PROCEDURE — 83880 ASSAY OF NATRIURETIC PEPTIDE: CPT | Mod: ER | Performed by: EMERGENCY MEDICINE

## 2024-03-03 PROCEDURE — 93010 ELECTROCARDIOGRAM REPORT: CPT | Mod: ,,, | Performed by: INTERNAL MEDICINE

## 2024-03-03 PROCEDURE — 87389 HIV-1 AG W/HIV-1&-2 AB AG IA: CPT | Performed by: EMERGENCY MEDICINE

## 2024-03-03 PROCEDURE — 25000003 PHARM REV CODE 250: Mod: ER | Performed by: EMERGENCY MEDICINE

## 2024-03-03 PROCEDURE — 80053 COMPREHEN METABOLIC PANEL: CPT | Mod: ER | Performed by: EMERGENCY MEDICINE

## 2024-03-03 PROCEDURE — 99285 EMERGENCY DEPT VISIT HI MDM: CPT | Mod: 25,ER

## 2024-03-03 PROCEDURE — 86803 HEPATITIS C AB TEST: CPT | Performed by: EMERGENCY MEDICINE

## 2024-03-03 PROCEDURE — 96374 THER/PROPH/DIAG INJ IV PUSH: CPT | Mod: ER

## 2024-03-03 PROCEDURE — 85025 COMPLETE CBC W/AUTO DIFF WBC: CPT | Mod: ER | Performed by: EMERGENCY MEDICINE

## 2024-03-03 PROCEDURE — 85379 FIBRIN DEGRADATION QUANT: CPT | Mod: ER | Performed by: EMERGENCY MEDICINE

## 2024-03-03 PROCEDURE — 93005 ELECTROCARDIOGRAM TRACING: CPT | Mod: ER

## 2024-03-03 PROCEDURE — 84484 ASSAY OF TROPONIN QUANT: CPT | Mod: ER | Performed by: EMERGENCY MEDICINE

## 2024-03-03 RX ORDER — METOPROLOL TARTRATE 1 MG/ML
5 INJECTION, SOLUTION INTRAVENOUS
Status: COMPLETED | OUTPATIENT
Start: 2024-03-03 | End: 2024-03-03

## 2024-03-03 RX ORDER — METOPROLOL TARTRATE 25 MG/1
25 TABLET, FILM COATED ORAL
Status: COMPLETED | OUTPATIENT
Start: 2024-03-03 | End: 2024-03-03

## 2024-03-03 RX ORDER — NAPROXEN SODIUM 220 MG/1
243 TABLET, FILM COATED ORAL
Status: COMPLETED | OUTPATIENT
Start: 2024-03-03 | End: 2024-03-03

## 2024-03-03 RX ORDER — METOPROLOL SUCCINATE 25 MG/1
25 TABLET, EXTENDED RELEASE ORAL DAILY
Qty: 10 TABLET | Refills: 2 | Status: SHIPPED | OUTPATIENT
Start: 2024-03-03

## 2024-03-03 RX ADMIN — METOROPROLOL TARTRATE 5 MG: 5 INJECTION, SOLUTION INTRAVENOUS at 01:03

## 2024-03-03 RX ADMIN — ASPIRIN 243 MG: 81 TABLET, CHEWABLE ORAL at 01:03

## 2024-03-03 RX ADMIN — METOPROLOL TARTRATE 25 MG: 25 TABLET, FILM COATED ORAL at 02:03

## 2024-03-03 NOTE — ED PROVIDER NOTES
Encounter Date: 3/3/2024       History     Chief Complaint   Patient presents with    Palpitations     Pt states that her heart is beating fast, onset yesterday, cardiac hx      She is followed by Dr. Dewayne Walters for Cardiology and has been for many years.  Underlying history of coronary disease, she reports single-vessel disease that was not able to be revascularized due to details of anatomy.  No history of myocardial infarction.  She does have a history of hypertension, hyperlipidemia, fibromyalgia, breast cancer in treatment, anemia, others as listed.  Multiple surgeries.  She has not had congestive heart failure or arrhythmias.  She has been on chronic beta-blocker therapy, metoprolol 100 mg XL once a day for many years.  Due to some recent sensations of dyspnea without other explanation, Dr. Rebolledo felt that she might be having dyspnea related to the beta-blocker and put her on a one-month wean.  She is extremely compliant with medication instructions, and I have reviewed her notes.  Beginning about 4 weeks ago she lowered her metoprolol dose from 100 mg to 50 mg, on the 20th last month she lowered it to 25 mg, and she took her last dose of extended release 25 mg metoprolol on the 28th of last month, now about 3-1/2 or 4 days ago.  Over the last 2 to 3 days she has had a gradually increasing sense of tachycardia with mild associated dyspnea, slight dyspnea on exertion, a tingling in her upper extremities, a slight discomfort in her chest, and a mild degree of anxiety.  She declines any antianxiety medicine due to an adverse reaction in the past.  No syncope, presyncope, nausea, vomiting, abdominal pain, back pain, urinary complaints, hemoptysis, pleurisy, or other complaints.  On arrival, maximum heart rate noted in the 160s, she is showing a steady sinus tachycardia without ectopy or dysrhythmia ranging from the 120s to 150s, fluctuating as described below.  No other complaints. Compliant with other meds as  well including ASA 81 mg this morning.  Review of available old EKGs suggests her baseline heart rate is usually in the 90s.    The history is provided by the patient and a friend. No  was used.     Review of patient's allergies indicates:   Allergen Reactions    Ciprofloxacin     Klonopin [clonazepam]     Opioids - morphine analogues     Plaquenil [hydroxychloroquine]     Shellfish containing products     Iodine and iodide containing products Rash     Past Medical History:   Diagnosis Date    Anemia     Arthritis     Cancer breast    right    Carotid artery disease     left    Coronary artery disease     Fibromyalgia     GERD (gastroesophageal reflux disease)     High cholesterol     Hypertension     Osteoporosis      Past Surgical History:   Procedure Laterality Date    BLADDER SURGERY      BREAST SURGERY      CARDIAC SURGERY       SECTION      MASTECTOMY, RADICAL Right     TUBAL LIGATION       Family History   Family history unknown: Yes     Social History     Tobacco Use    Smoking status: Never    Smokeless tobacco: Never   Substance Use Topics    Alcohol use: No     Alcohol/week: 0.0 standard drinks of alcohol    Drug use: No     Review of Systems   Constitutional:  Positive for fatigue. Negative for activity change and fever.   HENT:  Negative for congestion, ear pain, facial swelling, nosebleeds, sinus pressure and sore throat.    Eyes:  Negative for pain, discharge, redness and visual disturbance.   Respiratory:  Positive for shortness of breath. Negative for cough, choking, chest tightness and wheezing.    Cardiovascular:  Positive for palpitations. Negative for chest pain and leg swelling.   Gastrointestinal:  Negative for abdominal distention, abdominal pain, nausea and vomiting.   Endocrine: Negative for heat intolerance, polydipsia and polyuria.   Genitourinary:  Negative for difficulty urinating, dysuria, flank pain, hematuria and urgency.   Musculoskeletal:  Negative for  back pain, gait problem, joint swelling and myalgias.   Skin:  Negative for color change and rash.   Allergic/Immunologic: Negative for environmental allergies and food allergies.   Neurological:  Positive for weakness and numbness. Negative for dizziness and headaches.   Hematological:  Negative for adenopathy. Does not bruise/bleed easily.   Psychiatric/Behavioral:  Negative for agitation and behavioral problems. The patient is not nervous/anxious.    All other systems reviewed and are negative.      Physical Exam     Initial Vitals   BP Pulse Resp Temp SpO2   03/03/24 1234 03/03/24 1234 03/03/24 1234 03/03/24 1242 03/03/24 1234   (!) 141/63 (S) (!) 168 (!) 22 97.8 °F (36.6 °C) 97 %      MAP       --                Physical Exam    Nursing note and vitals reviewed.  Constitutional: She appears well-developed and well-nourished. She is not diaphoretic. No distress.   HENT:   Head: Normocephalic and atraumatic.   Mouth/Throat: No oropharyngeal exudate.   Eyes: Conjunctivae and EOM are normal. Pupils are equal, round, and reactive to light. Right eye exhibits no discharge. Left eye exhibits no discharge. No scleral icterus.   Neck: Neck supple. No thyromegaly present. No tracheal deviation present. No JVD present.   Normal range of motion.  Cardiovascular:  Regular rhythm, normal heart sounds and intact distal pulses.     Exam reveals no gallop and no friction rub.       No murmur heard.  Sinus tachycardia witnessed to range between the 120s and 150s, fluctuates with emotional state and focusing on medical issues   Pulmonary/Chest: Breath sounds normal. No stridor. No respiratory distress. She has no wheezes. She has no rhonchi. She has no rales. She exhibits no tenderness.   Abdominal: Abdomen is soft. Bowel sounds are normal. She exhibits no distension and no mass. There is no abdominal tenderness. There is no rebound and no guarding.   Musculoskeletal:         General: No tenderness or edema. Normal range of  motion.      Cervical back: Normal range of motion and neck supple.     Neurological: She is alert and oriented to person, place, and time. She has normal strength.   Skin: Skin is warm and dry. No rash and no abscess noted. No erythema.   Psychiatric: She has a normal mood and affect. Her behavior is normal. Judgment and thought content normal.   Mildly anxious         ED Course   Procedures  Labs Reviewed   CBC W/ AUTO DIFFERENTIAL - Abnormal; Notable for the following components:       Result Value    RBC 3.08 (*)     Hemoglobin 10.7 (*)     Hematocrit 32.5 (*)      (*)     MCH 34.7 (*)     Lymph # 0.9 (*)     Gran % 76.4 (*)     Lymph % 11.7 (*)     All other components within normal limits    Narrative:     Release to patient->Immediate   COMPREHENSIVE METABOLIC PANEL - Abnormal; Notable for the following components:    Potassium 3.4 (*)     CO2 20 (*)     Glucose 137 (*)     Alkaline Phosphatase 49 (*)     eGFR 58.8 (*)     Anion Gap 17 (*)     All other components within normal limits    Narrative:     Release to patient->Immediate   TROPONIN I    Narrative:     Release to patient->Immediate   B-TYPE NATRIURETIC PEPTIDE    Narrative:     Release to patient->Immediate   D DIMER, QUANTITATIVE    Narrative:     Release to patient->Immediate   LACTIC ACID, PLASMA   HIV 1 / 2 ANTIBODY   HEPATITIS C ANTIBODY   HEP C VIRUS HOLD SPECIMEN     EKG Readings: (Independently Interpreted)   Initial Reading: No STEMI. Rhythm: Sinus Tachycardia. Ectopy: No Ectopy.   Sinus tachycardia 138 beats per minute with possible left atrial enlargement and nonspecific ST abnormality.  Compared with previous, her usual baseline rates have been in the low to upper 90s, in the current ST abnormalities are slightly more pronounced.       Imaging Results              X-Ray Chest AP Portable (Final result)  Result time 03/03/24 13:30:43      Final result by Tyler Cai MD (03/03/24 13:30:43)                   Impression:       Stable chest x-ray.      Electronically signed by: Tyler Cai MD  Date:    03/03/2024  Time:    13:30               Narrative:    EXAMINATION:  XR CHEST AP PORTABLE    CLINICAL HISTORY:  Chest Pain;    COMPARISON:  01/06/2024.    FINDINGS:  No change.  Normal size heart.  Arch calcification.  Stable right apical/upper lobe pleuroparenchymal scarring.  The lungs are otherwise clear.  Right axillary surgical clips.                                       Medications   metoprolol injection 5 mg (5 mg Intravenous Given 3/3/24 1308)   aspirin chewable tablet 243 mg (243 mg Oral Given 3/3/24 1309)   metoprolol tartrate (LOPRESSOR) tablet 25 mg (25 mg Oral Given 3/3/24 1418)       1:29 PM Positive early response to 1st dose of IV metoprolol, heart rate now in the low 90s to 100, she can already feel improvement.    2:13 PM Remains stable in the 90's. Counseled. Recommend resume metoprolol at 25 mg a day for the short term and follow up with Dr. Petty licona for further recommendations.        Medical Decision Making  Problems Addressed:  Chest pain: acute illness or injury  Sinus tachycardia: acute illness or injury    Amount and/or Complexity of Data Reviewed  Labs: ordered. Decision-making details documented in ED Course.  Radiology: ordered. Decision-making details documented in ED Course.  ECG/medicine tests: ordered and independent interpretation performed. Decision-making details documented in ED Course.    Risk  OTC drugs.  Prescription drug management.  Decision regarding hospitalization.      Additional MDM:   Differential Diagnosis:   Angina, myocardial infarction, medication side effect, medication withdrawal, other causes tachycardia and chest discomfort.                                    Clinical Impression:  Final diagnoses:  [R07.9] Chest pain  [R00.0] Sinus tachycardia (Primary)          ED Disposition Condition    Discharge Stable          ED Prescriptions       Medication Sig Dispense Start Date End Date  Auth. Provider    metoprolol succinate (TOPROL-XL) 25 MG 24 hr tablet Take 1 tablet (25 mg total) by mouth once daily. 10 tablet 3/3/2024 -- Kelton Lucas MD          Follow-up Information       Follow up With Specialties Details Why Contact Info    [facility] Emergency Dept Emergency Medicine  As needed [id number]  [facility] [id number]  [phone]             Kelton Lucas MD  03/03/24 1604

## 2024-03-04 LAB
HCV AB SERPL QL IA: NEGATIVE
HIV 1+2 AB+HIV1 P24 AG SERPL QL IA: NEGATIVE
OHS QRS DURATION: 74 MS
OHS QTC CALCULATION: 445 MS